# Patient Record
Sex: FEMALE | Race: WHITE | Employment: OTHER | URBAN - METROPOLITAN AREA
[De-identification: names, ages, dates, MRNs, and addresses within clinical notes are randomized per-mention and may not be internally consistent; named-entity substitution may affect disease eponyms.]

---

## 2017-01-03 ENCOUNTER — OFFICE VISIT (OUTPATIENT)
Dept: INTERNAL MEDICINE CLINIC | Age: 82
End: 2017-01-03

## 2017-01-03 VITALS
SYSTOLIC BLOOD PRESSURE: 160 MMHG | HEART RATE: 66 BPM | BODY MASS INDEX: 27.38 KG/M2 | TEMPERATURE: 98.1 F | WEIGHT: 145 LBS | DIASTOLIC BLOOD PRESSURE: 60 MMHG | OXYGEN SATURATION: 99 % | RESPIRATION RATE: 16 BRPM | HEIGHT: 61 IN

## 2017-01-03 DIAGNOSIS — Z79.4 TYPE 2 DIABETES MELLITUS WITH OTHER CIRCULATORY COMPLICATION, WITH LONG-TERM CURRENT USE OF INSULIN (HCC): Primary | ICD-10-CM

## 2017-01-03 DIAGNOSIS — J18.9 CAP (COMMUNITY ACQUIRED PNEUMONIA): ICD-10-CM

## 2017-01-03 DIAGNOSIS — I10 HTN (HYPERTENSION), BENIGN: ICD-10-CM

## 2017-01-03 DIAGNOSIS — E11.59 TYPE 2 DIABETES MELLITUS WITH OTHER CIRCULATORY COMPLICATION, WITH LONG-TERM CURRENT USE OF INSULIN (HCC): Primary | ICD-10-CM

## 2017-01-03 LAB — HBA1C MFR BLD HPLC: 8.1 %

## 2017-01-03 RX ORDER — SPIRONOLACTONE 25 MG/1
25 TABLET ORAL DAILY
Qty: 90 TAB | Refills: 1 | Status: SHIPPED | OUTPATIENT
Start: 2017-01-03 | End: 2017-11-01 | Stop reason: SDUPTHER

## 2017-01-03 NOTE — PROGRESS NOTES
Chief Complaint   Patient presents with    Diabetes     follow up   C/ Eras 47 11/1/2016; Pneumonia     1. Have you been to the ER, urgent care clinic since your last visit? Hospitalized since your last visit?see above    2. Have you seen or consulted any other health care providers outside of the [address] [place] [address] since your last visit? Include any pap smears or colon screening. No  \"Reviewed record in preparation for visit and have obtained necessary documentation. \"

## 2017-01-03 NOTE — MR AVS SNAPSHOT
Visit Information Date & Time Provider Department Dept. Phone Encounter #  
 1/3/2017 11:00 AM Deirdre Crespo, [facility] [phone] Upcoming Health Maintenance Date Due DTaP/Tdap/Td series (1 - Tdap) 12/3/1954 EYE EXAM RETINAL OR DILATED Q1 4/30/2014 GLAUCOMA SCREENING Q2Y 4/30/2015 FOOT EXAM Q1 7/28/2016 MEDICARE YEARLY EXAM 11/9/2016 HEMOGLOBIN A1C Q6M 4/5/2017 MICROALBUMIN Q1 4/7/2017 LIPID PANEL Q1 4/7/2017 Allergies as of 1/3/2017  Review Complete On: 1/3/2017 By: Jad Pelletier RN No Known Allergies Current Immunizations  Reviewed on 10/5/2016 Name Date Influenza High Dose Vaccine PF 11/9/2015, 11/3/2014 Influenza Vaccine 9/12/2013 Influenza Vaccine Split 9/25/2012, 9/27/2011, 12/20/2010 Pneumococcal Conjugate (PCV-13) 11/1/2016 Pneumococcal Vaccine (Unspecified Type) 11/3/2014, 1/2/2002 Zoster Vaccine, Live 9/15/2016 Not reviewed this visit You Were Diagnosed With   
  
 Codes Comments Type 2 diabetes mellitus with other circulatory complication, with long-term current use of insulin (HCC)    -  Primary ICD-10-CM: E11.59, Z79.4 CAP (community acquired pneumonia)     ICD-10-CM: J18.9 ICD-9-CM: 763 Vitals BP Pulse Temp Resp Height(growth percentile) Weight(growth percentile) 160/60 66 98.1 °F (36.7 °C) (Oral) 16 5' 1\" (1.549 m) 145 lb (65.8 kg) SpO2 BMI OB Status Smoking Status 99% 27.4 kg/m2 Hysterectomy Never Smoker Vitals History BMI and BSA Data Body Mass Index Body Surface Area  
 27.4 kg/m 2 1.68 m 2 Preferred Pharmacy Pharmacy Name Phone [address] [facility] [address] [phone] Your Updated Medication List  
  
   
This list is accurate as of: 1/3/17 11:51 AM.  Always use your most recent med list.  
  
  
  
  
 aspirin 81 mg tablet Take  by mouth. carvedilol 25 mg tablet Commonly known as:  COREG  
TAKE 1 TABLET TWICE DAILY WITH MEALS  
  
 felodipine 10 mg 24 hr tablet Commonly known as:  PLENDIL SR  
TAKE 1 TABLET EVERY DAY  
  
 gabapentin 100 mg capsule Commonly known as:  NEURONTIN  
TAKE 1 TO 2 CAPSULES AT BEDTIME  
  
 glipiZIDE SR 2.5 mg CR tablet Commonly known as:  GLUCOTROL XL  
TAKE 1 TABLET EVERY DAY (NEW DOSE) * Insulin Needles (Disposable) 31 gauge x 5/16\" Ndle Commonly known as:  BD INSULIN PEN NEEDLE UF SHORT Daily * PEN NEEDLE 31 gauge x 5/16\" Ndle Generic drug:  Insulin Needles (Disposable) USE DAILY * LEVEMIR FLEXTOUCH 100 unit/mL (3 mL) Inpn Generic drug:  insulin detemir INJECT 40 UNITS SUBCUTANEOUSLY DAILY AS DIRECTED * insulin detemir 100 unit/mL (3 mL) Inpn Commonly known as:  LEVEMIR FLEXTOUCH  
0.45 mL by SubCUTAneous route nightly. losartan-hydroCHLOROthiazide 100-25 mg per tablet Commonly known as:  HYZAAR  
TAKE 1 TABLET EVERY DAY  
  
 metFORMIN 1,000 mg tablet Commonly known as:  GLUCOPHAGE  
TAKE 1 TABLET TWICE DAILY ( WITH MEALS ) AS DIRECTED  
  
 sertraline 25 mg tablet Commonly known as:  ZOLOFT Take 1 Tab by mouth daily. simvastatin 40 mg tablet Commonly known as:  ZOCOR  
TAKE 1 TABLET EVERY NIGHT  
  
 spironolactone 25 mg tablet Commonly known as:  ALDACTONE Take 1 Tab by mouth daily. VITAMIN D3 1,000 unit tablet Generic drug:  cholecalciferol Take  by mouth daily. * Notice: This list has 4 medication(s) that are the same as other medications prescribed for you. Read the directions carefully, and ask your doctor or other care provider to review them with you. Prescriptions Sent to Pharmacy Refills  
 spironolactone (ALDACTONE) 25 mg tablet 1 Sig: Take 1 Tab by mouth daily. Class: Normal  
 Pharmacy: [address] [place] [address], [address] [place] [address] Ph #: [phone]  Route: Oral  
  
 Introducing [facility]! Oralia Luis introduces [employer] patient portal. Now you can access parts of your medical record, email your doctor's office, and request medication refills online. 1. In your internet browser, go to [email or web address] 2. Click on the First Time User? Click Here link in the Sign In box. You will see the New Member Sign Up page. 3. Enter your [employer] Access Code exactly as it appears below. You will not need to use this code after youve completed the sign-up process. If you do not sign up before the expiration date, you must request a new code. · [employer] Access Code: [email or web address] Expires: 1/3/2017  1:12 PM 
 
4. Enter the last four digits of your Social Security Number (xxxx) and Date of Birth (mm/dd/yyyy) as indicated and click Submit. You will be taken to the next sign-up page. 5. Create a [employer] ID. This will be your [employer] login ID and cannot be changed, so think of one that is secure and easy to remember. 6. Create a [employer] password. You can change your password at any time. 7. Enter your Password Reset Question and Answer. This can be used at a later time if you forget your password. 8. Enter your e-mail address. You will receive e-mail notification when new information is available in [address] 9. Click Sign Up. You can now view and download portions of your medical record. 10. Click the Download Summary menu link to download a portable copy of your medical information. If you have questions, please visit the Frequently Asked Questions section of the [employer] website. Remember, [employer] is NOT to be used for urgent needs. For medical emergencies, dial 911. Now available from your iPhone and Android! Please provide this summary of care documentation to your next provider. Your primary care clinician is listed as Sen Stephen. If you have any questions after today's visit, please call [phone].

## 2017-01-03 NOTE — PROGRESS NOTES
Chief Complaint   Patient presents with    Diabetes     follow up   C/ Eras 47 11/1/2016; Pneumonia     Subjective:   Guy Heath is a 80 y.o. female  here for follow up of chronic medical conditions listed   Patient Active Problem List    Diagnosis Date Noted    CAP (community acquired pneumonia) 01/03/2017    Type 2 diabetes mellitus with circulatory disorder, with long-term current use of insulin ([facility] 10/05/2016    HTN (hypertension), benign 10/05/2016    Coronary artery disease involving native coronary artery of native heart without angina pectoris 04/07/2016    Urinary incontinence without sensory awareness 04/07/2016    Type 2 diabetes mellitus with circulatory disorder ([facility] 12/12/2013    Mobility impaired 12/12/2013    Vitamin D deficiency 03/28/2012    Diabetes mellitus with renal manifestations, uncontrolled ([facility] 03/28/2012    Diabetes ([facility] 12/20/2010    CAD (coronary artery disease), native coronary artery 06/18/2010    Hypercholesterolemia 06/18/2010    REYMUNDO (obstructive sleep apnea) 06/18/2009     Current Outpatient Prescriptions   Medication Sig Dispense Refill    spironolactone (ALDACTONE) 25 mg tablet TAKE 1/2 TABLET EVERY DAY 45 Tab 2    felodipine (PLENDIL SR) 10 mg 24 hr tablet TAKE 1 TABLET EVERY DAY 90 Tab 1    simvastatin (ZOCOR) 40 mg tablet TAKE 1 TABLET EVERY NIGHT 90 Tab 1    metFORMIN (GLUCOPHAGE) 1,000 mg tablet TAKE 1 TABLET TWICE DAILY ( WITH MEALS ) AS DIRECTED 180 Tab 3    glipiZIDE SR (GLUCOTROL) 2.5 mg CR tablet TAKE 1 TABLET EVERY DAY (NEW DOSE) 90 Tab 1    sertraline (ZOLOFT) 25 mg tablet Take 1 Tab by mouth daily.  90 Tab 1    carvedilol (COREG) 25 mg tablet TAKE 1 TABLET TWICE DAILY WITH MEALS 180 Tab 3    losartan-hydrochlorothiazide (HYZAAR) 100-25 mg per tablet TAKE 1 TABLET EVERY DAY 90 Tab 2    gabapentin (NEURONTIN) 100 mg capsule TAKE 1 TO 2 CAPSULES AT BEDTIME 180 Cap 3    LEVEMIR FLEXTOUCH 100 unit/mL (3 mL) inpn INJECT 40 UNITS SUBCUTANEOUSLY DAILY AS DIRECTED 15 Pen 1    LEVEMIR FLEXTOUCH 100 unit/mL (3 mL) inpn INJECT 40 UNITS SUBCUTANEOUSLY DAILY AS DIRECTED 15 Pen 6    PEN NEEDLE 31 X 5/16 \" ndle USE DAILY 100 Package 11    Insulin Needles, Disposable, (BD INSULIN PEN NEEDLE UF SHORT) 31 X 5/16 \" Ndle Daily 1 Package 11    cholecalciferol (VITAMIN D3) 1,000 unit tablet Take  by mouth daily.  aspirin 81 mg tablet Take  by mouth. Past Medical History   Diagnosis Date    CAD (coronary artery disease)     CAD (coronary artery disease), native coronary artery 6/18/2010    Diabetes ([facility] 12/20/2010    Hypercholesterolemia 6/18/2010    Hypertension 6/18/2010    Mobility impaired 12/12/2013     Use cane and as needed wheelchair and walker      REYMUNDO (obstructive sleep apnea) 6/18/2010     Past Surgical History   Procedure Laterality Date    Hx gyn      Hx cholecystectomy      Hx appendectomy      Pr cardiac surg procedure unlist  1989     cabg    Pr cardiac surg procedure unlist       stent   . She  is accompanied by patient and daughter. She lives as follows: alone and does have Advanced Directives. New Complaints today include: Diabetes (follow up) and Hospital Follow Up [facility] 11/1/2016; Pneumonia)   This 80 y.o. female is more than 17 years post coronary bypass surgery with some element of congestive heart failure. She lives most of the time in [place] in a [facility]. Her daughter owns the [facility] park so she sees her daughter a lot. She complains of being lonely there. She has not a lot of friends and she is not able to do a lot. She notes, and her daughter notes, that mom has had shortness of breath with exertion which is worse over the last three to six months. Seen ER 11?2016 in [place] given levaquin  750 for cap    She has no PND or orthopnea. Denies angina. Generally has been not as well. She has had a depressed mood.   She has been somewhat sad, reserved, and withdrawn. She denies palpitation. Denies syncope or presyncope. She has not seen cardiology in a couple of years. She has been advised on several current occasions by me, to see her Cardiologist, Dr. Magaña Daily. She is very compliant with taking her medications. She is not compliant with her diet. She is not compliant with sleep CPAP. She has been told to have sleep apnea in the past and has never been compliant. She is not compliant with checking her blood sugars. She just does not check blood sugars. This is reviewed in detail with the family. Never saw cardiology      Recent History includes:She was seen in the emergency room in the hospital in [place], [place]. This was actually two months ago around the first or second of November. Was hospitalized overnight. Was admitted with community acquired pneumonia. I have copies of her lab reports. I do not have a copy of her chest X-ray. Her labs were pretty stable. There was no evidence of heart failure. Her chemistries were good. Her creatinine was 0.8. Her hemoglobin was 11.1. She did not have a leukocytosis or left shift. She was treated with antibiotics and sent home, and she improved. Her blood pressure was elevated. They told her to increase her Aldactone to 25 mg a day instead of 12.5 mg. She has not done that. She had called my office and I had told her to wait until I saw her. Home blood pressures have been a little elevated once again. Not doing home blood sugars. She feels better. She has regained her strength. She is back to normal activities. Review of Systems  A comprehensive review of systems was negative except for: Behvioral/Psych: positive for depression    Geriatric Issues:    Activities of Daily Living (ADLs):    She is independent in the following: ambulation, bathing and hygiene, feeding, continence, grooming, toileting and dressing  Requires assistance with the following: driving   food   No dementia    Outside reports reviewed: none. Patient Active Problem List    Diagnosis Date Noted    CAP (community acquired pneumonia) 01/03/2017    Type 2 diabetes mellitus with circulatory disorder, with long-term current use of insulin ([facility] 10/05/2016    HTN (hypertension), benign 10/05/2016    Coronary artery disease involving native coronary artery of native heart without angina pectoris 04/07/2016    Urinary incontinence without sensory awareness 04/07/2016    Type 2 diabetes mellitus with circulatory disorder ([facility] 12/12/2013    Mobility impaired 12/12/2013    Vitamin D deficiency 03/28/2012    Diabetes mellitus with renal manifestations, uncontrolled ([facility] 03/28/2012    Diabetes ([facility] 12/20/2010    CAD (coronary artery disease), native coronary artery 06/18/2010    Hypercholesterolemia 06/18/2010    REYMUNDO (obstructive sleep apnea) 06/18/2009     Current Outpatient Prescriptions   Medication Sig Dispense Refill    spironolactone (ALDACTONE) 25 mg tablet TAKE 1/2 TABLET EVERY DAY 45 Tab 2    felodipine (PLENDIL SR) 10 mg 24 hr tablet TAKE 1 TABLET EVERY DAY 90 Tab 1    simvastatin (ZOCOR) 40 mg tablet TAKE 1 TABLET EVERY NIGHT 90 Tab 1    metFORMIN (GLUCOPHAGE) 1,000 mg tablet TAKE 1 TABLET TWICE DAILY ( WITH MEALS ) AS DIRECTED 180 Tab 3    glipiZIDE SR (GLUCOTROL) 2.5 mg CR tablet TAKE 1 TABLET EVERY DAY (NEW DOSE) 90 Tab 1    sertraline (ZOLOFT) 25 mg tablet Take 1 Tab by mouth daily.  90 Tab 1    carvedilol (COREG) 25 mg tablet TAKE 1 TABLET TWICE DAILY WITH MEALS 180 Tab 3    losartan-hydrochlorothiazide (HYZAAR) 100-25 mg per tablet TAKE 1 TABLET EVERY DAY 90 Tab 2    gabapentin (NEURONTIN) 100 mg capsule TAKE 1 TO 2 CAPSULES AT BEDTIME 180 Cap 3    LEVEMIR FLEXTOUCH 100 unit/mL (3 mL) inpn INJECT 40 UNITS SUBCUTANEOUSLY DAILY AS DIRECTED 15 Pen 1    LEVEMIR FLEXTOUCH 100 unit/mL (3 mL) inpn INJECT 40 UNITS SUBCUTANEOUSLY DAILY AS DIRECTED 15 Pen 6    PEN NEEDLE 31 X 5/16 \" ndle USE DAILY 100 Package 11    Insulin Needles, Disposable, (BD INSULIN PEN NEEDLE UF SHORT) 31 X 5/16 \" Ndle Daily 1 Package 11    cholecalciferol (VITAMIN D3) 1,000 unit tablet Take  by mouth daily.  aspirin 81 mg tablet Take  by mouth.        Past Medical History   Diagnosis Date    CAD (coronary artery disease)     CAD (coronary artery disease), native coronary artery 6/18/2010    Diabetes ([address] 12/20/2010    Hypercholesterolemia 6/18/2010    Hypertension 6/18/2010    Mobility impaired 12/12/2013     Use cane and as needed wheelchair and walker      REYMUNDO (obstructive sleep apnea) 6/18/2010     Past Surgical History   Procedure Laterality Date    Hx gyn      Hx cholecystectomy      Hx appendectomy      Pr cardiac surg procedure unlist  1989     cabg    Pr cardiac surg procedure unlist       stent     Family History   Problem Relation Age of Onset    Heart Disease Mother     Heart Disease Father     Diabetes Brother         Vitals:    01/03/17 1108   BP: 169/69   Pulse: 66   Resp: 16   Temp: 98.1 °F (36.7 °C)   TempSrc: Oral   SpO2: 99%   Weight: 145 lb (65.8 kg)   Height: 5' 1\" (1.549 m)       Objective:     Visit Vitals    /69 (BP 1 Location: Right arm, BP Patient Position: Sitting)    Pulse 66    Temp 98.1 °F (36.7 °C) (Oral)    Resp 16    Ht 5' 1\" (1.549 m)    Wt 145 lb (65.8 kg)    SpO2 99%    BMI 27.4 kg/m2     Visit Vitals    /69 (BP 1 Location: Right arm, BP Patient Position: Sitting)    Pulse 66    Temp 98.1 °F (36.7 °C) (Oral)    Resp 16    Ht 5' 1\" (1.549 m)    Wt 145 lb (65.8 kg)    SpO2 99%    BMI 27.4 kg/m2     General appearance: alert, cooperative, no distress, appears stated age  Neck: supple, symmetrical, trachea midline, no adenopathy, thyroid: not enlarged, symmetric, no tenderness/mass/nodules, no carotid bruit and no JVD  Lungs: clear to auscultation bilaterally  Heart: normal apical impulse, regular rate and rhythm, S4 present  Abdomen: soft, non-tender. Bowel sounds normal. No masses,  no organomegaly  Neurologic: Grossly normal  Balance poor  Mood normal  Imaging      Lab Review  Lab Results   Component Value Date/Time    Hemoglobin A1c 7.3 03/22/2011 12:01 PM    Hemoglobin A1c 7.6 12/20/2010 12:16 PM    Hemoglobin A1c 8.8 09/20/2010 10:49 AM    Glucose 183 10/05/2016 02:23 PM    Microalbumin/Creat ratio (mg/g creat) 24 06/18/2010 10:04 AM    Microalb/Creat ratio (ug/mg creat.) 512.9 04/07/2016 10:38 AM    Microalbumin,urine random 2.66 06/18/2010 10:04 AM    LDL, calculated 69 04/07/2016 10:38 AM    Creatinine 0.79 10/05/2016 02:23 PM      Lab Results   Component Value Date/Time    Cholesterol, total 153 04/07/2016 10:38 AM    HDL Cholesterol 44 04/07/2016 10:38 AM    LDL, calculated 69 04/07/2016 10:38 AM    Triglyceride 200 04/07/2016 10:38 AM    CHOL/HDL Ratio 3.3 09/20/2010 10:33 AM       Lab Results   Component Value Date/Time    GFR est AA 81 10/05/2016 02:23 PM    GFR est non-AA 70 10/05/2016 02:23 PM    Creatinine 0.79 10/05/2016 02:23 PM    BUN 25 10/05/2016 02:23 PM    Sodium 145 10/05/2016 02:23 PM    Potassium 4.7 10/05/2016 02:23 PM    Chloride 102 10/05/2016 02:23 PM    CO2 24 10/05/2016 02:23 PM         Assessment/Plan:         High risk medications reviewed    Concern for worsening cad  But no symptoms    Diabetes controlled adequately    Reinforce good nutrition and not skippiong meals                Fall risk is high due to aging factors and multiple other medical issues    Fall prevention discussed and methods to reduce falls and injuries are reviewed in detail  Aguilar Joel was seen today for diabetes and hospital follow up.     Diagnoses and all orders for this visit:    Type 2 diabetes mellitus with other circulatory complication, with long-term current use of insulin (HCC)  -     AMB POC HEMOGLOBIN A1C    CAP (community acquired pneumonia)    HTN (hypertension), benign    Other orders  -     insulin detemir (LEVEMIR FLEXTOUCH) 100 unit/mL (3 mL) inpn; 0.45 mL by SubCUTAneous route nightly. -     spironolactone (ALDACTONE) 25 mg tablet; Take 1 Tab by mouth daily.       Increase insulin aldactone increase dose  CAP appears to be resolved from 2 months ago

## 2017-02-06 RX ORDER — SERTRALINE HYDROCHLORIDE 25 MG/1
TABLET, FILM COATED ORAL
Qty: 90 TAB | Refills: 1 | Status: SHIPPED | OUTPATIENT
Start: 2017-02-06 | End: 2017-06-21 | Stop reason: SDUPTHER

## 2017-02-07 ENCOUNTER — OFFICE VISIT (OUTPATIENT)
Dept: INTERNAL MEDICINE CLINIC | Age: 82
End: 2017-02-07

## 2017-02-07 VITALS
HEIGHT: 61 IN | BODY MASS INDEX: 27.38 KG/M2 | HEART RATE: 60 BPM | RESPIRATION RATE: 16 BRPM | TEMPERATURE: 97.7 F | SYSTOLIC BLOOD PRESSURE: 150 MMHG | DIASTOLIC BLOOD PRESSURE: 60 MMHG | OXYGEN SATURATION: 96 % | WEIGHT: 145 LBS

## 2017-02-07 DIAGNOSIS — J06.9 URI, ACUTE: Primary | ICD-10-CM

## 2017-02-07 NOTE — PROGRESS NOTES
Subjective:      History was provided by the patient. Vj Gar is a 80 y.o. female who presents for evaluation of symptoms of a URI. Symptoms include post nasal drip and chest congestion. Onset of symptoms was 3 days ago, unchanged since that time. She also c/o 2 days lightheadedness. She is drinking plenty of fluids. . Evaluation to date: none. Treatment to date: none  Past Medical History   Diagnosis Date    CAD (coronary artery disease)     CAD (coronary artery disease), native coronary artery 6/18/2010    Diabetes ([address] 12/20/2010    Hypercholesterolemia 6/18/2010    Hypertension 6/18/2010    Mobility impaired 12/12/2013     Use cane and as needed wheelchair and walker      REYMUNDO (obstructive sleep apnea) 6/18/2010     Current Outpatient Prescriptions   Medication Sig Dispense Refill    sertraline (ZOLOFT) 25 mg tablet TAKE 1 TABLET EVERY DAY 90 Tab 1    insulin detemir (LEVEMIR FLEXTOUCH) 100 unit/mL (3 mL) inpn 0.45 mL by SubCUTAneous route nightly. 15 Pen 1    spironolactone (ALDACTONE) 25 mg tablet Take 1 Tab by mouth daily.  90 Tab 1    felodipine (PLENDIL SR) 10 mg 24 hr tablet TAKE 1 TABLET EVERY DAY 90 Tab 1    simvastatin (ZOCOR) 40 mg tablet TAKE 1 TABLET EVERY NIGHT 90 Tab 1    metFORMIN (GLUCOPHAGE) 1,000 mg tablet TAKE 1 TABLET TWICE DAILY ( WITH MEALS ) AS DIRECTED 180 Tab 3    glipiZIDE SR (GLUCOTROL) 2.5 mg CR tablet TAKE 1 TABLET EVERY DAY (NEW DOSE) 90 Tab 1    carvedilol (COREG) 25 mg tablet TAKE 1 TABLET TWICE DAILY WITH MEALS 180 Tab 3    losartan-hydrochlorothiazide (HYZAAR) 100-25 mg per tablet TAKE 1 TABLET EVERY DAY 90 Tab 2    gabapentin (NEURONTIN) 100 mg capsule TAKE 1 TO 2 CAPSULES AT BEDTIME 180 Cap 3    LEVEMIR FLEXTOUCH 100 unit/mL (3 mL) inpn INJECT 40 UNITS SUBCUTANEOUSLY DAILY AS DIRECTED 15 Pen 6    PEN NEEDLE 31 X 5/16 \" ndle USE DAILY 100 Package 11    Insulin Needles, Disposable, (BD INSULIN PEN NEEDLE UF SHORT) 31 X 5/16 \" Ndle Daily 1 Package 11    cholecalciferol (VITAMIN D3) 1,000 unit tablet Take  by mouth daily.  aspirin 81 mg tablet Take  by mouth. No Known Allergies  Social History     Social History    Marital status: SINGLE     Spouse name: N/A    Number of children: N/A    Years of education: N/A     Occupational History    Not on file. Social History Main Topics    Smoking status: Never Smoker    Smokeless tobacco: Never Used    Alcohol use No    Drug use: No    Sexual activity: Not Currently     Other Topics Concern    Not on file     Social History Narrative     Review of Systems  A comprehensive review of systems was negative except for that written in the HPI. Objective:     Visit Vitals    /60    Pulse 60    Temp 97.7 °F (36.5 °C) (Oral)    Resp 16    Ht 5' 1\" (1.549 m)    Wt 145 lb (65.8 kg)    SpO2 96%    BMI 27.4 kg/m2     General:  alert, cooperative, no distress, appears stated age   Head:  NCAT w/o lesions or tenderness   Eyes: negative   Ears:    Sinus tender: negative   Mouth:  Lips, mucosa, and tongue normal. Teeth and gums normal   Neck: supple, symmetrical, trachea midline, no adenopathy, thyroid: not enlarged, symmetric, no tenderness/mass/nodules, no carotid bruit and no JVD. Lungs: clear to auscultation bilaterally   Abdomen: soft, non-tender. Bowel sounds normal. No masses,  no organomegaly        Assessment:     viral upper respiratory illness    Plan:   Discussed dx and tx of URIs  Discussed the importance of avoiding unnecessary abx therapy. Suggested symptomatic OTC remedies. RTC prn. .The patient complains of symptoms consistent with a viral upper respiratory infection. she has non-productive cough without dyspnea or wheezing. Symptomatic therapy suggested: mucinex 1200mg twice daily, pseudofedrine, tylenol or advil as directed on bottle. Increase fluids, use vaporizer, stay in steamy bathroom tid 15 min prn severe cough, rest, avoid smoky areas.     she was warned about pneumonia alarm symptoms and advised to call the office or come to the Emergency Room should they develop. Call office if seeing improvement in 72 hours.

## 2017-02-07 NOTE — MR AVS SNAPSHOT
Visit Information Date & Time Provider Department Dept. Phone Encounter #  
 2/7/2017 11:30 AM Cherelle Guthrie MD [facility] [phone] [id number] Your Appointments 4/6/2017 11:00 AM  
ROUTINE CARE with Cherelle Guthrie MD  
[facility] [address]) Appt Note: 3 month f/u  
 [place] [address] [place] [address] [facility] [address]  
[facility] [address] [facility] [address] [facility] [address] [address] Upcoming Health Maintenance Date Due DTaP/Tdap/Td series (1 - Tdap) [birth year]3/1954 EYE EXAM RETINAL OR DILATED Q1 4/30/2014 GLAUCOMA SCREENING Q2Y 4/30/2015 FOOT EXAM Q1 7/28/2016 MEDICARE YEARLY EXAM 11/9/2016 MICROALBUMIN Q1 4/7/2017 LIPID PANEL Q1 4/7/2017 HEMOGLOBIN A1C Q6M 7/3/2017 Allergies as of 2/7/2017  Review Complete On: 2/7/2017 By: Fabricio Muniz LPN No Known Allergies Current Immunizations  Reviewed on 10/5/2016 Name Date Influenza High Dose Vaccine PF 11/9/2015, 11/3/2014 Influenza Vaccine 9/12/2013 Influenza Vaccine Split 9/25/2012, 9/27/2011, 12/20/2010 Pneumococcal Conjugate (PCV-13) 11/1/2016 Pneumococcal Vaccine (Unspecified Type) 11/3/2014, 1/2/2002 Zoster Vaccine, Live 9/15/2016 Not reviewed this visit You Were Diagnosed With   
  
 Codes Comments URI, acute    -  Primary ICD-10-CM: J06.9 ICD-9-CM: 465.9 Vitals BP Pulse Temp Resp Height(growth percentile) Weight(growth percentile) 150/60 60 97.7 °F (36.5 °C) (Oral) 16 5' 1\" (1.549 m) 145 lb (65.8 kg) SpO2 BMI OB Status Smoking Status 96% 27.4 kg/m2 Hysterectomy Never Smoker Vitals History BMI and BSA Data Body Mass Index Body Surface Area  
 27.4 kg/m 2 1.68 m 2 Preferred Pharmacy Pharmacy Name Phone [facility] [address] Your Updated Medication List  
  
   
 This list is accurate as of: 2/7/17 12:10 PM.  Always use your most recent med list.  
  
  
  
  
 aspirin 81 mg tablet Take  by mouth. carvedilol 25 mg tablet Commonly known as:  COREG  
TAKE 1 TABLET TWICE DAILY WITH MEALS  
  
 felodipine 10 mg 24 hr tablet Commonly known as:  PLENDIL SR  
TAKE 1 TABLET EVERY DAY  
  
 gabapentin 100 mg capsule Commonly known as:  NEURONTIN  
TAKE 1 TO 2 CAPSULES AT BEDTIME  
  
 glipiZIDE SR 2.5 mg CR tablet Commonly known as:  GLUCOTROL XL  
TAKE 1 TABLET EVERY DAY (NEW DOSE) * Insulin Needles (Disposable) 31 gauge x 5/16\" Ndle Commonly known as:  BD INSULIN PEN NEEDLE UF SHORT Daily * PEN NEEDLE 31 gauge x 5/16\" Ndle Generic drug:  Insulin Needles (Disposable) USE DAILY * LEVEMIR FLEXTOUCH 100 unit/mL (3 mL) Inpn Generic drug:  insulin detemir INJECT 40 UNITS SUBCUTANEOUSLY DAILY AS DIRECTED * insulin detemir 100 unit/mL (3 mL) Inpn Commonly known as:  LEVEMIR FLEXTOUCH  
0.45 mL by SubCUTAneous route nightly. losartan-hydroCHLOROthiazide 100-25 mg per tablet Commonly known as:  HYZAAR  
TAKE 1 TABLET EVERY DAY  
  
 metFORMIN 1,000 mg tablet Commonly known as:  GLUCOPHAGE  
TAKE 1 TABLET TWICE DAILY ( WITH MEALS ) AS DIRECTED  
  
 sertraline 25 mg tablet Commonly known as:  ZOLOFT  
TAKE 1 TABLET EVERY DAY  
  
 simvastatin 40 mg tablet Commonly known as:  ZOCOR  
TAKE 1 TABLET EVERY NIGHT  
  
 spironolactone 25 mg tablet Commonly known as:  ALDACTONE Take 1 Tab by mouth daily. VITAMIN D3 1,000 unit tablet Generic drug:  cholecalciferol Take  by mouth daily. * Notice: This list has 4 medication(s) that are the same as other medications prescribed for you. Read the directions carefully, and ask your doctor or other care provider to review them with you. Introducing hospitals & HEALTH SERVICES!    
 Atilio Martínez introduces [employer] patient portal. Now you can access parts of your medical record, email your doctor's office, and request medication refills online. 1. In your internet browser, go to [email or web address] 2. Click on the First Time User? Click Here link in the Sign In box. You will see the New Member Sign Up page. 3. Enter your [employer] Access Code exactly as it appears below. You will not need to use this code after youve completed the sign-up process. If you do not sign up before the expiration date, you must request a new code. · [employer] Access Code: [email or web address] Expires: 5/8/2017 12:10 PM 
 
4. Enter the last four digits of your Social Security Number (xxxx) and Date of Birth (mm/dd/yyyy) as indicated and click Submit. You will be taken to the next sign-up page. 5. Create a [employer] ID. This will be your [employer] login ID and cannot be changed, so think of one that is secure and easy to remember. 6. Create a [employer] password. You can change your password at any time. 7. Enter your Password Reset Question and Answer. This can be used at a later time if you forget your password. 8. Enter your e-mail address. You will receive e-mail notification when new information is available in [address] 9. Click Sign Up. You can now view and download portions of your medical record. 10. Click the Download Summary menu link to download a portable copy of your medical information. If you have questions, please visit the Frequently Asked Questions section of the [employer] website. Remember, [employer] is NOT to be used for urgent needs. For medical emergencies, dial 911. Now available from your iPhone and Android! Please provide this summary of care documentation to your next provider. Your primary care clinician is listed as Saintclair Rima. If you have any questions after today's visit, please call [phone].

## 2017-02-27 RX ORDER — GLIPIZIDE 2.5 MG/1
TABLET, EXTENDED RELEASE ORAL
Qty: 90 TAB | Refills: 1 | Status: SHIPPED | OUTPATIENT
Start: 2017-02-27 | End: 2017-07-17 | Stop reason: SDUPTHER

## 2017-03-13 RX ORDER — FELODIPINE 10 MG/1
TABLET, EXTENDED RELEASE ORAL
Qty: 90 TAB | Refills: 1 | Status: SHIPPED | OUTPATIENT
Start: 2017-03-13 | End: 2017-07-31 | Stop reason: SDUPTHER

## 2017-05-05 ENCOUNTER — HOSPITAL ENCOUNTER (OUTPATIENT)
Dept: LAB | Age: 82
Discharge: HOME | End: 2017-05-05
Payer: MEDICARE

## 2017-05-05 ENCOUNTER — OFFICE VISIT (OUTPATIENT)
Dept: INTERNAL MEDICINE CLINIC | Age: 82
End: 2017-05-05

## 2017-05-05 VITALS
OXYGEN SATURATION: 98 % | WEIGHT: 148 LBS | HEIGHT: 61 IN | DIASTOLIC BLOOD PRESSURE: 74 MMHG | RESPIRATION RATE: 16 BRPM | BODY MASS INDEX: 27.94 KG/M2 | HEART RATE: 70 BPM | SYSTOLIC BLOOD PRESSURE: 170 MMHG

## 2017-05-05 DIAGNOSIS — Z13.39 SCREENING FOR ALCOHOLISM: ICD-10-CM

## 2017-05-05 DIAGNOSIS — Z79.4 UNCONTROLLED TYPE 2 DIABETES MELLITUS WITH CHRONIC KIDNEY DISEASE, WITH LONG-TERM CURRENT USE OF INSULIN, UNSPECIFIED CKD STAGE: Primary | ICD-10-CM

## 2017-05-05 DIAGNOSIS — Z71.89 COUNSELING REGARDING ADVANCED DIRECTIVES: ICD-10-CM

## 2017-05-05 DIAGNOSIS — E11.22 UNCONTROLLED TYPE 2 DIABETES MELLITUS WITH CHRONIC KIDNEY DISEASE, WITH LONG-TERM CURRENT USE OF INSULIN, UNSPECIFIED CKD STAGE: Primary | ICD-10-CM

## 2017-05-05 DIAGNOSIS — E78.00 HYPERCHOLESTEROLEMIA: ICD-10-CM

## 2017-05-05 DIAGNOSIS — Z00.00 ROUTINE GENERAL MEDICAL EXAMINATION AT A HEALTH CARE FACILITY: ICD-10-CM

## 2017-05-05 DIAGNOSIS — E11.65 UNCONTROLLED TYPE 2 DIABETES MELLITUS WITH CHRONIC KIDNEY DISEASE, WITH LONG-TERM CURRENT USE OF INSULIN, UNSPECIFIED CKD STAGE: Primary | ICD-10-CM

## 2017-05-05 DIAGNOSIS — E11.59 TYPE 2 DIABETES MELLITUS WITH OTHER CIRCULATORY COMPLICATION, WITH LONG-TERM CURRENT USE OF INSULIN (HCC): ICD-10-CM

## 2017-05-05 DIAGNOSIS — Z79.4 TYPE 2 DIABETES MELLITUS WITH OTHER CIRCULATORY COMPLICATION, WITH LONG-TERM CURRENT USE OF INSULIN (HCC): ICD-10-CM

## 2017-05-05 DIAGNOSIS — I25.10 CORONARY ARTERY DISEASE INVOLVING NATIVE CORONARY ARTERY OF NATIVE HEART WITHOUT ANGINA PECTORIS: ICD-10-CM

## 2017-05-05 PROBLEM — J18.9 CAP (COMMUNITY ACQUIRED PNEUMONIA): Status: RESOLVED | Noted: 2017-01-03 | Resolved: 2017-05-05

## 2017-05-05 PROCEDURE — 82043 UR ALBUMIN QUANTITATIVE: CPT

## 2017-05-05 PROCEDURE — 80061 LIPID PANEL: CPT

## 2017-05-05 PROCEDURE — 85025 COMPLETE CBC W/AUTO DIFF WBC: CPT

## 2017-05-05 PROCEDURE — 80053 COMPREHEN METABOLIC PANEL: CPT

## 2017-05-05 PROCEDURE — 36415 COLL VENOUS BLD VENIPUNCTURE: CPT

## 2017-05-05 NOTE — MR AVS SNAPSHOT
Visit Information Date & Time Provider Department Dept. Phone Encounter #  
 5/5/2017 11:15 AM Adriane Caldera MD [facility] [phone] [id number] Upcoming Health Maintenance Date Due DTaP/Tdap/Td series (1 - Tdap) 12/3/1954 EYE EXAM RETINAL OR DILATED Q1 4/30/2014 GLAUCOMA SCREENING Q2Y 4/30/2015 FOOT EXAM Q1 7/28/2016 MEDICARE YEARLY EXAM 11/9/2016 MICROALBUMIN Q1 4/7/2017 LIPID PANEL Q1 4/7/2017 HEMOGLOBIN A1C Q6M 7/3/2017 INFLUENZA AGE 9 TO ADULT 8/1/2017 Allergies as of 5/5/2017  Review Complete On: 5/5/2017 By: Emmanuel Canseco LPN No Known Allergies Current Immunizations  Reviewed on 5/5/2017 Name Date Influenza High Dose Vaccine PF 11/9/2015, 11/3/2014 Influenza Vaccine 9/12/2013 Influenza Vaccine Split 9/25/2012, 9/27/2011, 12/20/2010 Pneumococcal Conjugate (PCV-13) 11/1/2016 Pneumococcal Vaccine (Unspecified Type) 11/3/2014, 1/2/2002 Zoster Vaccine, Live 9/15/2016 Reviewed by Adriane Caldera MD on 5/5/2017 at 11:20 AM  
You Were Diagnosed With   
  
 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  
 170/74 70 16 5' 1\" (1.549 m) 148 lb (67.1 kg) 98% BMI OB Status Smoking Status 27.96 kg/m2 Hysterectomy Never Smoker Vitals History BMI and BSA Data Body Mass Index Body Surface Area  
 27.96 kg/m 2 1.7 m 2 Preferred Pharmacy Pharmacy Name [facility] [address] [facility] Your Updated Medication List  
  
   
This list is accurate as of: 5/5/17 11:36 AM.  Always use your most recent med list.  
  
  
  
  
 aspirin 81 mg tablet Take  by mouth. carvedilol 25 mg tablet Commonly known as:  COREG  
TAKE 1 TABLET TWICE DAILY WITH MEALS  
  
 felodipine 10 mg 24 hr tablet Commonly known as:  PLENDIL SR  
TAKE 1 TABLET EVERY DAY  
  
 gabapentin 100 mg capsule Commonly known as:  NEURONTIN  
TAKE 1 TO 2 CAPSULES AT BEDTIME  
  
 glipiZIDE SR 2.5 mg CR tablet Commonly known as:  GLUCOTROL XL  
TAKE 1 TABLET EVERY DAY (NEW DOSE) * insulin detemir 100 unit/mL (3 mL) Inpn Commonly known as:  LEVEMIR FLEXTOUCH  
0.45 mL by SubCUTAneous route nightly. * LEVEMIR FLEXTOUCH 100 unit/mL (3 mL) Inpn Generic drug:  insulin detemir INJECT 40 UNITS SUBCUTANEOUSLY EVERY DAY AS DIRECTED * Insulin Needles (Disposable) 31 gauge x 5/16\" Ndle Commonly known as:  BD INSULIN PEN NEEDLE UF SHORT Daily * PEN NEEDLE 31 gauge x 5/16\" Ndle Generic drug:  Insulin Needles (Disposable) USE DAILY losartan-hydroCHLOROthiazide 100-25 mg per tablet Commonly known as:  HYZAAR  
TAKE 1 TABLET EVERY DAY  
  
 metFORMIN 1,000 mg tablet Commonly known as:  GLUCOPHAGE  
TAKE 1 TABLET TWICE DAILY ( WITH MEALS ) AS DIRECTED  
  
 sertraline 25 mg tablet Commonly known as:  ZOLOFT  
TAKE 1 TABLET EVERY DAY  
  
 simvastatin 40 mg tablet Commonly known as:  ZOCOR  
TAKE 1 TABLET EVERY NIGHT  
  
 spironolactone 25 mg tablet Commonly known as:  ALDACTONE Take 1 Tab by mouth daily. VITAMIN D3 1,000 unit tablet Generic drug:  cholecalciferol Take  by mouth daily. * Notice:   This list has 4 medication(s) that are the same as other medications prescribed for you. Read the directions carefully, and ask your doctor or other care provider to review them with you. We Performed the Following CBC WITH AUTOMATED DIFF [92513 CPT(R)]  DIABETES FOOT EXAM [HM7 Custom] LIPID PANEL [86917 CPT(R)] METABOLIC PANEL, COMPREHENSIVE [16770 CPT(R)] MICROALBUMIN, UR, RAND W/ MICROALBUMIN/CREA RATIO Y317261 CPT(R)] Introducing [facility] & HEALTH SERVICES! Sheron Altamirano introduces [employer] patient portal. Now you can access parts of your medical record, email your doctor's office, and request medication refills online. 1. In your internet browser, go to [email or web address] 2. Click on the First Time User? Click Here link in the Sign In box. You will see the New Member Sign Up page. 3. Enter your [employer] Access Code exactly as it appears below. You will not need to use this code after youve completed the sign-up process. If you do not sign up before the expiration date, you must request a new code. · [employer] Access Code: [email or web address] Expires: 5/8/2017  1:10 PM 
 
4. Enter the last four digits of your Social Security Number (xxxx) and Date of Birth (mm/dd/yyyy) as indicated and click Submit. You will be taken to the next sign-up page. 5. Create a [employer] ID. This will be your [employer] login ID and cannot be changed, so think of one that is secure and easy to remember. 6. Create a [employer] password. You can change your password at any time. 7. Enter your Password Reset Question and Answer. This can be used at a later time if you forget your password. 8. Enter your e-mail address. You will receive e-mail notification when new information is available in [address] 9. Click Sign Up. You can now view and download portions of your medical record. 10. Click the Download Summary menu link to download a portable copy of your medical information. If you have questions, please visit the Frequently Asked Questions section of the [employer] website. Remember, [employer] is NOT to be used for urgent needs. For medical emergencies, dial 911. Now available from your iPhone and Android! Please provide this summary of care documentation to your next provider. Your primary care clinician is listed as Violeta Garcia. If you have any questions after today's visit, please call [phone].

## 2017-05-05 NOTE — PROGRESS NOTES
Chief Complaint   Patient presents with    Follow-up     Subjective:   Marquis Sweet is a 80 y.o. female  here for follow up of chronic medical conditions listed   Patient Active Problem List    Diagnosis Date Noted    CAP (community acquired pneumonia) 01/03/2017    Type 2 diabetes mellitus with circulatory disorder, with long-term current use of insulin ([facility] 10/05/2016    HTN (hypertension), benign 10/05/2016    Coronary artery disease involving native coronary artery of native heart without angina pectoris 04/07/2016    Urinary incontinence without sensory awareness 04/07/2016    Type 2 diabetes mellitus with circulatory disorder ([facility] 12/12/2013    Mobility impaired 12/12/2013    Vitamin D deficiency 03/28/2012    Diabetes mellitus with renal manifestations, uncontrolled ([facility] 03/28/2012    Diabetes ([facility] 12/20/2010    CAD (coronary artery disease), native coronary artery 06/18/2010    Hypercholesterolemia 06/18/2010    REYMUNDO (obstructive sleep apnea) 06/18/2009     Current Outpatient Prescriptions   Medication Sig Dispense Refill    felodipine (PLENDIL SR) 10 mg 24 hr tablet TAKE 1 TABLET EVERY DAY 90 Tab 1    LEVEMIR FLEXTOUCH 100 unit/mL (3 mL) inpn INJECT 40 UNITS SUBCUTANEOUSLY EVERY DAY AS DIRECTED 15 Pen 3    glipiZIDE SR (GLUCOTROL XL) 2.5 mg CR tablet TAKE 1 TABLET EVERY DAY (NEW DOSE) 90 Tab 1    sertraline (ZOLOFT) 25 mg tablet TAKE 1 TABLET EVERY DAY 90 Tab 1    insulin detemir (LEVEMIR FLEXTOUCH) 100 unit/mL (3 mL) inpn 0.45 mL by SubCUTAneous route nightly. 15 Pen 1    spironolactone (ALDACTONE) 25 mg tablet Take 1 Tab by mouth daily.  90 Tab 1    simvastatin (ZOCOR) 40 mg tablet TAKE 1 TABLET EVERY NIGHT 90 Tab 1    metFORMIN (GLUCOPHAGE) 1,000 mg tablet TAKE 1 TABLET TWICE DAILY ( WITH MEALS ) AS DIRECTED 180 Tab 3    carvedilol (COREG) 25 mg tablet TAKE 1 TABLET TWICE DAILY WITH MEALS 180 Tab 3    losartan-hydrochlorothiazide (HYZAAR) 100-25 mg per tablet TAKE 1 TABLET EVERY DAY 90 Tab 2    gabapentin (NEURONTIN) 100 mg capsule TAKE 1 TO 2 CAPSULES AT BEDTIME 180 Cap 3    PEN NEEDLE 31 X 5/16 \" ndle USE DAILY 100 Package 11    Insulin Needles, Disposable, (BD INSULIN PEN NEEDLE UF SHORT) 31 X 5/16 \" Ndle Daily 1 Package 11    cholecalciferol (VITAMIN D3) 1,000 unit tablet Take  by mouth daily.  aspirin 81 mg tablet Take  by mouth. Past Medical History:   Diagnosis Date    CAD (coronary artery disease)     CAD (coronary artery disease), native coronary artery 6/18/2010    Diabetes ([facility] 12/20/2010    Hypercholesterolemia 6/18/2010    Hypertension 6/18/2010    Mobility impaired 12/12/2013    Use cane and as needed wheelchair and walker      REYMUNDO (obstructive sleep apnea) 6/18/2010     Past Surgical History:   Procedure Laterality Date    CARDIAC SURG PROCEDURE UNLIST  1989    cabg    CARDIAC SURG PROCEDURE UNLIST      stent    HX APPENDECTOMY      HX CHOLECYSTECTOMY      HX GYN     . She  is accompanied by patient  She lives as follows: alone and does have Advanced Directives. New Complaints today include: Follow-up   This 80 y.o. female is more than 17 years post coronary bypass surgery with some element of congestive heart failure. She lives most of the time in [facility] in a trailer. Her daughter owns the [facility] park so she sees her daughter a lot. She complains of being lonely there. She has not a lot of friends and she is not able to do a lot. She notes, and her daughter notes, that mom has had shortness of breath with exertion which is worse over the last three to six months. Seen ER 11?2016 in [place] given levaquin  750 for cap    She has no PND or orthopnea. Denies angina. Generally has been not as well. She has had a depressed mood. She has been somewhat sad, reserved, and withdrawn. She denies palpitation. Denies syncope or presyncope. She has not seen cardiology in a couple of years.   She has been advised on several current occasions by me, to see her Cardiologist, Dr. Osvaldo Rome. She is very compliant with taking her medications. She is not compliant with her diet. She is not compliant with sleep CPAP. She has been told to have sleep apnea in the past and has never been compliant. She is not compliant with checking her blood sugars. She just does not check blood sugars. This is reviewed in detail with the family. Never saw cardiology      Recent History includes:She was seen in the emergency room in the hospital in [place], [place]. This was actually two months ago around the first or second of November. Was hospitalized overnight. Was admitted with community acquired pneumonia. I have copies of her lab reports. I do not have a copy of her chest X-ray. Her labs were pretty stable. There was no evidence of heart failure. Her chemistries were good. Her creatinine was 0.8. Her hemoglobin was 11.1. She did not have a leukocytosis or left shift. She was treated with antibiotics and sent home, and she improved. Her blood pressure was elevated. They told her to increase her Aldactone to 25 mg a day instead of 12.5 mg. She has not done that. She had called my office and I had told her to wait until I saw her. Home blood pressures have been a little elevated once again. Not doing home blood sugars. She feels good  She has regained her strength. She is back to normal activities. Review of Systems  A comprehensive review of systems was negative except for: Behvioral/Psych: positive for depression    Geriatric Issues: Activities of Daily Living (ADLs):    She is independent in the following: ambulation, bathing and hygiene, feeding, continence, grooming, toileting and dressing  Requires assistance with the following: driving   food   No dementia    Outside reports reviewed: none.     Patient Active Problem List    Diagnosis Date Noted    CAP (community acquired pneumonia) 01/03/2017    Type 2 diabetes mellitus with circulatory disorder, with long-term current use of insulin ([facility] 10/05/2016    HTN (hypertension), benign 10/05/2016    Coronary artery disease involving native coronary artery of native heart without angina pectoris 04/07/2016    Urinary incontinence without sensory awareness 04/07/2016    Type 2 diabetes mellitus with circulatory disorder ([facility] 12/12/2013    Mobility impaired 12/12/2013    Vitamin D deficiency 03/28/2012    Diabetes mellitus with renal manifestations, uncontrolled ([facility] 03/28/2012    Diabetes ([facility] 12/20/2010    CAD (coronary artery disease), native coronary artery 06/18/2010    Hypercholesterolemia 06/18/2010    REYMUNDO (obstructive sleep apnea) 06/18/2009     Current Outpatient Prescriptions   Medication Sig Dispense Refill    felodipine (PLENDIL SR) 10 mg 24 hr tablet TAKE 1 TABLET EVERY DAY 90 Tab 1    LEVEMIR FLEXTOUCH 100 unit/mL (3 mL) inpn INJECT 40 UNITS SUBCUTANEOUSLY EVERY DAY AS DIRECTED 15 Pen 3    glipiZIDE SR (GLUCOTROL XL) 2.5 mg CR tablet TAKE 1 TABLET EVERY DAY (NEW DOSE) 90 Tab 1    sertraline (ZOLOFT) 25 mg tablet TAKE 1 TABLET EVERY DAY 90 Tab 1    insulin detemir (LEVEMIR FLEXTOUCH) 100 unit/mL (3 mL) inpn 0.45 mL by SubCUTAneous route nightly. 15 Pen 1    spironolactone (ALDACTONE) 25 mg tablet Take 1 Tab by mouth daily.  90 Tab 1    simvastatin (ZOCOR) 40 mg tablet TAKE 1 TABLET EVERY NIGHT 90 Tab 1    metFORMIN (GLUCOPHAGE) 1,000 mg tablet TAKE 1 TABLET TWICE DAILY ( WITH MEALS ) AS DIRECTED 180 Tab 3    carvedilol (COREG) 25 mg tablet TAKE 1 TABLET TWICE DAILY WITH MEALS 180 Tab 3    losartan-hydrochlorothiazide (HYZAAR) 100-25 mg per tablet TAKE 1 TABLET EVERY DAY 90 Tab 2    gabapentin (NEURONTIN) 100 mg capsule TAKE 1 TO 2 CAPSULES AT BEDTIME 180 Cap 3    PEN NEEDLE 31 X 5/16 \" ndle USE DAILY 100 Package 11    Insulin Needles, Disposable, (BD INSULIN PEN NEEDLE UF SHORT) 31 X 5/16 \" Ndle Daily 1 Package 11    cholecalciferol (VITAMIN D3) 1,000 unit tablet Take  by mouth daily.  aspirin 81 mg tablet Take  by mouth. Past Medical History:   Diagnosis Date    CAD (coronary artery disease)     CAD (coronary artery disease), native coronary artery 6/18/2010    Diabetes ([address] 12/20/2010    Hypercholesterolemia 6/18/2010    Hypertension 6/18/2010    Mobility impaired 12/12/2013    Use cane and as needed wheelchair and walker      REYMUNDO (obstructive sleep apnea) 6/18/2010     Past Surgical History:   Procedure Laterality Date    CARDIAC SURG PROCEDURE UNLIST  1989    cabg    CARDIAC SURG PROCEDURE UNLIST      stent    HX APPENDECTOMY      HX CHOLECYSTECTOMY      HX GYN       Family History   Problem Relation Age of Onset    Heart Disease Mother     Heart Disease Father     Diabetes Brother         Vitals:    05/05/17 1109   BP: 170/74   Pulse: 70   Resp: 16   SpO2: 98%   Weight: 148 lb (67.1 kg)   Height: 5' 1\" (1.549 m)       Objective:     Visit Vitals    /74    Pulse 70    Resp 16    Ht 5' 1\" (1.549 m)    Wt 148 lb (67.1 kg)    SpO2 98%    BMI 27.96 kg/m2     Visit Vitals    /74    Pulse 70    Resp 16    Ht 5' 1\" (1.549 m)    Wt 148 lb (67.1 kg)    SpO2 98%    BMI 27.96 kg/m2     General appearance: alert, cooperative, no distress, appears stated age  Neck: supple, symmetrical, trachea midline, no adenopathy, thyroid: not enlarged, symmetric, no tenderness/mass/nodules, no carotid bruit and no JVD  Lungs: clear to auscultation bilaterally  Heart: normal apical impulse, regular rate and rhythm, S4 present  Abdomen: soft, non-tender.  Bowel sounds normal. No masses,  no organomegaly  Neurologic: Grossly normal  Balance poor  Mood normal  Imaging      Lab Review  Lab Results   Component Value Date/Time    Hemoglobin A1c 7.3 03/22/2011 12:01 PM    Hemoglobin A1c 7.6 12/20/2010 12:16 PM    Hemoglobin A1c 8.8 09/20/2010 10:49 AM    Glucose 183 10/05/2016 02:23 PM Microalbumin/Creat ratio (mg/g creat) 24 06/18/2010 10:04 AM    Microalb/Creat ratio (ug/mg creat.) 512.9 04/07/2016 10:38 AM    Microalbumin,urine random 2.66 06/18/2010 10:04 AM    LDL, calculated 69 04/07/2016 10:38 AM    Creatinine 0.79 10/05/2016 02:23 PM      Lab Results   Component Value Date/Time    Cholesterol, total 153 04/07/2016 10:38 AM    HDL Cholesterol 44 04/07/2016 10:38 AM    LDL, calculated 69 04/07/2016 10:38 AM    Triglyceride 200 04/07/2016 10:38 AM    CHOL/HDL Ratio 3.3 09/20/2010 10:33 AM       Lab Results   Component Value Date/Time    GFR est AA 81 10/05/2016 02:23 PM    GFR est non-AA 70 10/05/2016 02:23 PM    Creatinine 0.79 10/05/2016 02:23 PM    BUN 25 10/05/2016 02:23 PM    Sodium 145 10/05/2016 02:23 PM    Potassium 4.7 10/05/2016 02:23 PM    Chloride 102 10/05/2016 02:23 PM    CO2 24 10/05/2016 02:23 PM         Assessment/Plan:         High risk medications reviewed    Concern for worsening cad  But no symptoms    Diabetes controlled adequately    Reinforce good nutrition and not skippiong meals                Fall risk is high due to aging factors and multiple other medical issues    Fall prevention discussed and methods to reduce falls and injuries are reviewed in detail  There are no diagnoses linked to this encounter. Increase insulin aldactone increase dose  CAP appears to be resolved from 2 months ag    This is a Subsequent Medicare Annual Wellness Visit providing Personalized Prevention Plan Services (PPPS) (Performed 12 months after initial AWV and PPPS )    I have reviewed the patient's medical history in detail and updated the computerized patient record.      History     Past Medical History:   Diagnosis Date    CAD (coronary artery disease)     CAD (coronary artery disease), native coronary artery 6/18/2010    Diabetes ([facility] 12/20/2010    Hypercholesterolemia 6/18/2010    Hypertension 6/18/2010    Mobility impaired 12/12/2013    Use cane and as needed wheelchair and walker      REYMUNDO (obstructive sleep apnea) 6/18/2010      Past Surgical History:   Procedure Laterality Date    CARDIAC SURG PROCEDURE UNLIST  1989    cabg    CARDIAC SURG PROCEDURE UNLIST      stent    HX APPENDECTOMY      HX CHOLECYSTECTOMY      HX GYN       Current Outpatient Prescriptions   Medication Sig Dispense Refill    felodipine (PLENDIL SR) 10 mg 24 hr tablet TAKE 1 TABLET EVERY DAY 90 Tab 1    LEVEMIR FLEXTOUCH 100 unit/mL (3 mL) inpn INJECT 40 UNITS SUBCUTANEOUSLY EVERY DAY AS DIRECTED 15 Pen 3    glipiZIDE SR (GLUCOTROL XL) 2.5 mg CR tablet TAKE 1 TABLET EVERY DAY (NEW DOSE) 90 Tab 1    sertraline (ZOLOFT) 25 mg tablet TAKE 1 TABLET EVERY DAY 90 Tab 1    insulin detemir (LEVEMIR FLEXTOUCH) 100 unit/mL (3 mL) inpn 0.45 mL by SubCUTAneous route nightly. 15 Pen 1    spironolactone (ALDACTONE) 25 mg tablet Take 1 Tab by mouth daily. 90 Tab 1    simvastatin (ZOCOR) 40 mg tablet TAKE 1 TABLET EVERY NIGHT 90 Tab 1    metFORMIN (GLUCOPHAGE) 1,000 mg tablet TAKE 1 TABLET TWICE DAILY ( WITH MEALS ) AS DIRECTED 180 Tab 3    carvedilol (COREG) 25 mg tablet TAKE 1 TABLET TWICE DAILY WITH MEALS 180 Tab 3    losartan-hydrochlorothiazide (HYZAAR) 100-25 mg per tablet TAKE 1 TABLET EVERY DAY 90 Tab 2    gabapentin (NEURONTIN) 100 mg capsule TAKE 1 TO 2 CAPSULES AT BEDTIME 180 Cap 3    PEN NEEDLE 31 X 5/16 \" ndle USE DAILY 100 Package 11    Insulin Needles, Disposable, (BD INSULIN PEN NEEDLE UF SHORT) 31 X 5/16 \" Ndle Daily 1 Package 11    cholecalciferol (VITAMIN D3) 1,000 unit tablet Take  by mouth daily.  aspirin 81 mg tablet Take  by mouth.        No Known Allergies  Family History   Problem Relation Age of Onset    Heart Disease Mother     Heart Disease Father     Diabetes Brother      Social History   Substance Use Topics    Smoking status: Never Smoker    Smokeless tobacco: Never Used    Alcohol use No     Patient Active Problem List   Diagnosis Code    CAD (coronary artery disease), native coronary artery I25.10    REYMUNDO (obstructive sleep apnea) G47.33    Hypercholesterolemia E78.00    Diabetes ([facility] E11.9    Vitamin D deficiency E55.9    Diabetes mellitus with renal manifestations, uncontrolled ([facility]) E11.29, E11.65    Type 2 diabetes mellitus with circulatory disorder ([facility]) E11.59    Mobility impaired Z74.09    Coronary artery disease involving native coronary artery of native heart without angina pectoris I25.10    Urinary incontinence without sensory awareness N39.42    Type 2 diabetes mellitus with circulatory disorder, with long-term current use of insulin ([facility]) E11.59, Z79.4    HTN (hypertension), benign I10    CAP (community acquired pneumonia) J18.9       Depression Risk Factor Screening:     PHQ over the last two weeks 5/5/2017   Little interest or pleasure in doing things Not at all   Feeling down, depressed or hopeless Not at all   Total Score PHQ 2 0     Alcohol Risk Factor Screening: On any occasion during the past 3 months, have you had more than 3 drinks containing alcohol? No    Do you average more than 7 drinks per week? No      Functional Ability and Level of Safety:     Hearing Loss   moderate    Activities of Daily Living   Self-care. Requires assistance with: no ADLs    Fall Risk     Fall Risk Assessment, last 12 mths 5/5/2017   Able to walk? Yes   Fall in past 12 months? No     Abuse Screen   Patient is not abused    Review of Systems   Pertinent items are noted in HPI.     Physical Examination     Evaluation of Cognitive Function:  Mood/affect:  neutral, happy  Appearance: age appropriate  Family member/caregiver input: no    Visit Vitals    /74    Pulse 70    Resp 16    Ht 5' 1\" (1.549 m)    Wt 148 lb (67.1 kg)    SpO2 98%    BMI 27.96 kg/m2     General appearance: alert, cooperative, no distress, appears stated age  Lungs: clear to auscultation bilaterally  Heart: regular rate and rhythm, S1, S2 normal, no murmur, click, rub or sabrina  Neurologic: Mental status: Alert, oriented, thought content appropriate  Gait: Antalgic    Patient Care Team:  Raymond Berger MD as PCP - General    Advice/Referrals/Counseling   Education and counseling provided:  Are appropriate based on today's review and evaluation  End-of-Life planning (with patient's consent)  Pneumococcal Vaccine  Bone mass measurement (DEXA)      Assessment/Plan   Sarita Denton was seen today for follow-up. Diagnoses and all orders for this visit:    Uncontrolled type 2 diabetes mellitus with chronic kidney disease, with long-term current use of insulin, unspecified CKD stage  -     HM DIABETES FOOT EXAM  -     CBC WITH AUTOMATED DIFF  -     LIPID PANEL  -     METABOLIC PANEL, COMPREHENSIVE  -     MICROALBUMIN, UR, RAND W/ MICROALBUMIN/CREA RATIO    Routine general medical examination at a [facility] facility    Screening for alcoholism    Type 2 diabetes mellitus with other circulatory complication, with long-term current use of insulin ([facility]    . Advance Care Planning (ACP) Provider Conversation Snapshot    Date of ACP Conversation: 05/05/17  Persons included in Conversation:  patient  Length of ACP Conversation in minutes:  <16 minutes (Non-Billable)    Authorized Decision Maker (if patient is incapable of making informed decisions): This person is: Other Legally Authorized Decision Maker (e.g. Next of Kin)          For Patients with Decision Making Capacity:   Values/Goals: Exploration of values, goals, and preferences if recovery is not expected, even with continued medical treatment in the event of:  Imminent death  Severe, permanent brain injury  \"In these circumstances, what matters most to you? \"  Care focused more on comfort or quality of life.     Conversation Outcomes / Follow-Up Plan:   Recommended completion of Advance Directive form after review of ACP materials and conversation with prospective healthcare agent   Recommended communicating the plan and making copies for the healthcare agent, personal physician, and others as appropriate (e.g., health system)  Recommended review of completed ACP document annually or upon change in health status Diabetes controlled adequately      Hypertension controlled for age based on guidelines  Angina no symptoms  Diabetes controlled adequately      1. Routine general medical examination at a health care facility      2. Screening for alcoholism  none    3. Uncontrolled type 2 diabetes mellitus with chronic kidney disease, with long-term current use of insulin, unspecified CKD stage  Stable for now  -  DIABETES FOOT EXAM  - CBC WITH AUTOMATED DIFF  - LIPID PANEL  - METABOLIC PANEL, COMPREHENSIVE  - MICROALBUMIN, UR, RAND W/ MICROALBUMIN/CREA RATIO    4. Type 2 diabetes mellitus with other circulatory complication, with long-term current use of insulin ([address]  controll OK    5. Counseling regarding advanced directives  reviewed    6. Hypercholesterolemia  Lab Results   Component Value Date/Time    Cholesterol, total 153 04/07/2016 10:38 AM    HDL Cholesterol 44 04/07/2016 10:38 AM    LDL, calculated 69 04/07/2016 10:38 AM    VLDL, calculated 40 04/07/2016 10:38 AM    Triglyceride 200 04/07/2016 10:38 AM    CHOL/HDL Ratio 3.3 09/20/2010 10:33 AM     LDL < 70    7.  Coronary artery disease involving native coronary artery of native heart without angina pectoris  No  Angina or pnd

## 2017-05-05 NOTE — LETTER
5/8/2017 1:24 PM 
 
Ms. Reva Barajas [address] [facility] [id number] Dear Reva Barajas: 
 
Please find your most recent results below. Resulted Orders CBC WITH AUTOMATED DIFF Result Value Ref Range WBC 10.6 3.4 - 10.8 x10E3/uL  
 RBC 3.93 3.77 - 5.28 x10E6/uL HGB 12.5 11.1 - 15.9 g/dL HCT 37.9 34.0 - 46.6 % MCV 96 79 - 97 fL  
 MCH 31.8 26.6 - 33.0 pg  
 MCHC 33.0 31.5 - 35.7 g/dL  
 RDW 14.2 12.3 - 15.4 % PLATELET 772 214 - 428 x10E3/uL NEUTROPHILS 56 % Lymphocytes 32 % MONOCYTES 7 % EOSINOPHILS 4 % BASOPHILS 1 %  
 ABS. NEUTROPHILS 6.1 1.4 - 7.0 x10E3/uL Abs Lymphocytes 3.4 (H) 0.7 - 3.1 x10E3/uL  
 ABS. MONOCYTES 0.7 0.1 - 0.9 x10E3/uL  
 ABS. EOSINOPHILS 0.4 0.0 - 0.4 x10E3/uL  
 ABS. BASOPHILS 0.1 0.0 - 0.2 x10E3/uL IMMATURE GRANULOCYTES 0 %  
 ABS. IMM. GRANS. 0.0 0.0 - 0.1 x10E3/uL Hematology comments: Note:   
   Comment:  
   Verified by microscopic examination. Narrative Performed at:  [address]  [id number] : Janette Lozano MD, Phone:  [phone] LIPID PANEL Result Value Ref Range Cholesterol, total 145 100 - 199 mg/dL Triglyceride 192 (H) 0 - 149 mg/dL HDL Cholesterol 43 >39 mg/dL VLDL, calculated 38 5 - 40 mg/dL LDL, calculated 64 0 - 99 mg/dL Narrative Performed at:  [address]  [id number] : Janette Lozano MD, Phone:  [phone] METABOLIC PANEL, COMPREHENSIVE Result Value Ref Range Glucose 210 (H) 65 - 99 mg/dL BUN 22 8 - 27 mg/dL Creatinine 0.87 0.57 - 1.00 mg/dL GFR est non-AA 62 >59 mL/min/1.73 GFR est AA 71 >59 mL/min/1.73  
 BUN/Creatinine ratio 25 12 - 28 Sodium 140 134 - 144 mmol/L Potassium 5.0 3.5 - 5.2 mmol/L Chloride 101 96 - 106 mmol/L  
 CO2 20 18 - 29 mmol/L Calcium 9.9 8.7 - 10.3 mg/dL Protein, total 7.1 6.0 - 8.5 g/dL Albumin 4.2 3.5 - 4.7 g/dL GLOBULIN, TOTAL 2.9 1.5 - 4.5 g/dL A-G Ratio 1.4 1.2 - 2.2 Bilirubin, total 0.3 0.0 - 1.2 mg/dL Alk. phosphatase 50 39 - 117 IU/L  
 AST (SGOT) 15 0 - 40 IU/L  
 ALT (SGPT) 16 0 - 32 IU/L Narrative Performed at:  [address]  [id number] : Venita Graves MD, Phone:  [phone] MICROALBUMIN, UR, RAND W/ MICROALBUMIN/CREA RATIO Result Value Ref Range Creatinine, urine 37.1 Not Estab. mg/dL Microalbumin, urine 259.6 Not Estab. ug/mL Microalb/Creat ratio (ug/mg creat.) 699.7 (H) 0.0 - 30.0 mg/g creat Narrative Performed at:  [address]  [id number] : Venita Graves MD, Phone:  [phone] CVD REPORT Result Value Ref Range INTERPRETATION Note Comment:  
   Supplement report is available. Narrative Performed at:  [facility] [place] [address] [place] [address]  [id number] : Brandon Sunshine PhD, Phone:  [phone] DIABETES PATIENT EDUCATION Result Value Ref Range PDF Image Not applicable Narrative Performed at:  [facility] [place] [address] [place] [address]  [id number] : Brandon Heart PhD, Phone:  [phone] RECOMMENDATIONS: 
I have reviewed all lab results which are stable. Please call me if you have any questions: [phone] Sincerely, Ally Reyez MD

## 2017-05-06 LAB
ALBUMIN SERPL-MCNC: 4.2 G/DL (ref 3.5–4.7)
ALBUMIN/CREAT UR: 699.7 MG/G CREAT (ref 0–30)
ALBUMIN/GLOB SERPL: 1.4 {RATIO} (ref 1.2–2.2)
ALP SERPL-CCNC: 50 IU/L (ref 39–117)
ALT SERPL-CCNC: 16 IU/L (ref 0–32)
AST SERPL-CCNC: 15 IU/L (ref 0–40)
BASOPHILS # BLD AUTO: 0.1 X10E3/UL (ref 0–0.2)
BASOPHILS NFR BLD AUTO: 1 %
BILIRUB SERPL-MCNC: 0.3 MG/DL (ref 0–1.2)
BUN SERPL-MCNC: 22 MG/DL (ref 8–27)
BUN/CREAT SERPL: 25 (ref 12–28)
CALCIUM SERPL-MCNC: 9.9 MG/DL (ref 8.7–10.3)
CHLORIDE SERPL-SCNC: 101 MMOL/L (ref 96–106)
CHOLEST SERPL-MCNC: 145 MG/DL (ref 100–199)
CO2 SERPL-SCNC: 20 MMOL/L (ref 18–29)
CREAT SERPL-MCNC: 0.87 MG/DL (ref 0.57–1)
CREAT UR-MCNC: 37.1 MG/DL
EOSINOPHIL # BLD AUTO: 0.4 X10E3/UL (ref 0–0.4)
EOSINOPHIL NFR BLD AUTO: 4 %
ERYTHROCYTE [DISTWIDTH] IN BLOOD BY AUTOMATED COUNT: 14.2 % (ref 12.3–15.4)
GLOBULIN SER CALC-MCNC: 2.9 G/DL (ref 1.5–4.5)
GLUCOSE SERPL-MCNC: 210 MG/DL (ref 65–99)
HCT VFR BLD AUTO: 37.9 % (ref 34–46.6)
HDLC SERPL-MCNC: 43 MG/DL
HGB BLD-MCNC: 12.5 G/DL (ref 11.1–15.9)
IMM GRANULOCYTES # BLD: 0 X10E3/UL (ref 0–0.1)
IMM GRANULOCYTES NFR BLD: 0 %
INTERPRETATION, 910389: NORMAL
LDLC SERPL CALC-MCNC: 64 MG/DL (ref 0–99)
LYMPHOCYTES # BLD AUTO: 3.4 X10E3/UL (ref 0.7–3.1)
LYMPHOCYTES NFR BLD AUTO: 32 %
Lab: NORMAL
MCH RBC QN AUTO: 31.8 PG (ref 26.6–33)
MCHC RBC AUTO-ENTMCNC: 33 G/DL (ref 31.5–35.7)
MCV RBC AUTO: 96 FL (ref 79–97)
MICROALBUMIN UR-MCNC: 259.6 UG/ML
MONOCYTES # BLD AUTO: 0.7 X10E3/UL (ref 0.1–0.9)
MONOCYTES NFR BLD AUTO: 7 %
MORPHOLOGY BLD-IMP: ABNORMAL
NEUTROPHILS # BLD AUTO: 6.1 X10E3/UL (ref 1.4–7)
NEUTROPHILS NFR BLD AUTO: 56 %
PLATELET # BLD AUTO: 374 X10E3/UL (ref 150–379)
POTASSIUM SERPL-SCNC: 5 MMOL/L (ref 3.5–5.2)
PROT SERPL-MCNC: 7.1 G/DL (ref 6–8.5)
RBC # BLD AUTO: 3.93 X10E6/UL (ref 3.77–5.28)
SODIUM SERPL-SCNC: 140 MMOL/L (ref 134–144)
TRIGL SERPL-MCNC: 192 MG/DL (ref 0–149)
VLDLC SERPL CALC-MCNC: 38 MG/DL (ref 5–40)
WBC # BLD AUTO: 10.6 X10E3/UL (ref 3.4–10.8)

## 2017-05-08 NOTE — PROGRESS NOTES
Letter sent to the address on file to notify the patient per Dr Uli Montenegro that the labs are normal/stable. Asked for a return call to the office with any additional questions.

## 2017-05-09 RX ORDER — LOSARTAN POTASSIUM AND HYDROCHLOROTHIAZIDE 25; 100 MG/1; MG/1
TABLET ORAL
Qty: 90 TAB | Refills: 2 | Status: SHIPPED | OUTPATIENT
Start: 2017-05-09 | End: 2017-11-23

## 2017-05-17 RX ORDER — CARVEDILOL 25 MG/1
TABLET ORAL
Qty: 180 TAB | Refills: 3 | Status: SHIPPED | OUTPATIENT
Start: 2017-05-17 | End: 2018-08-04 | Stop reason: SDUPTHER

## 2017-06-16 RX ORDER — SIMVASTATIN 40 MG/1
TABLET, FILM COATED ORAL
Qty: 90 TAB | Refills: 1 | Status: SHIPPED | OUTPATIENT
Start: 2017-06-16 | End: 2017-11-06 | Stop reason: SDUPTHER

## 2017-06-21 RX ORDER — SERTRALINE HYDROCHLORIDE 25 MG/1
TABLET, FILM COATED ORAL
Qty: 90 TAB | Refills: 1 | Status: SHIPPED | OUTPATIENT
Start: 2017-06-21 | End: 2017-11-06 | Stop reason: SDUPTHER

## 2017-07-17 RX ORDER — GLIPIZIDE 2.5 MG/1
TABLET, EXTENDED RELEASE ORAL
Qty: 90 TAB | Refills: 1 | Status: SHIPPED | OUTPATIENT
Start: 2017-07-17 | End: 2017-11-23

## 2017-07-31 RX ORDER — FELODIPINE 10 MG/1
TABLET, EXTENDED RELEASE ORAL
Qty: 90 TAB | Refills: 1 | Status: SHIPPED | OUTPATIENT
Start: 2017-07-31 | End: 2017-12-04 | Stop reason: SDUPTHER

## 2017-08-17 ENCOUNTER — OFFICE VISIT (OUTPATIENT)
Dept: INTERNAL MEDICINE CLINIC | Age: 82
End: 2017-08-17

## 2017-08-17 VITALS
BODY MASS INDEX: 27.38 KG/M2 | RESPIRATION RATE: 18 BRPM | TEMPERATURE: 97.9 F | WEIGHT: 145 LBS | HEART RATE: 67 BPM | OXYGEN SATURATION: 96 % | HEIGHT: 61 IN | SYSTOLIC BLOOD PRESSURE: 143 MMHG | DIASTOLIC BLOOD PRESSURE: 64 MMHG

## 2017-08-17 DIAGNOSIS — I10 ESSENTIAL HYPERTENSION: ICD-10-CM

## 2017-08-17 DIAGNOSIS — I10 HTN (HYPERTENSION), BENIGN: ICD-10-CM

## 2017-08-17 DIAGNOSIS — Z74.09 MOBILITY IMPAIRED: ICD-10-CM

## 2017-08-17 DIAGNOSIS — I25.10 CORONARY ARTERY DISEASE INVOLVING NATIVE CORONARY ARTERY OF NATIVE HEART WITHOUT ANGINA PECTORIS: ICD-10-CM

## 2017-08-17 DIAGNOSIS — Z79.4 TYPE 2 DIABETES MELLITUS WITH OTHER CIRCULATORY COMPLICATION, WITH LONG-TERM CURRENT USE OF INSULIN (HCC): Primary | ICD-10-CM

## 2017-08-17 DIAGNOSIS — E11.59 TYPE 2 DIABETES MELLITUS WITH OTHER CIRCULATORY COMPLICATION, WITH LONG-TERM CURRENT USE OF INSULIN (HCC): Primary | ICD-10-CM

## 2017-08-17 DIAGNOSIS — R06.09 DYSPNEA ON EXERTION: ICD-10-CM

## 2017-08-17 RX ORDER — GABAPENTIN 100 MG/1
CAPSULE ORAL
Qty: 180 CAP | Refills: 3 | Status: SHIPPED | OUTPATIENT
Start: 2017-08-17 | End: 2017-08-18 | Stop reason: SDUPTHER

## 2017-08-17 NOTE — PROGRESS NOTES
Coordination of Care Questions    1. Have you been to the ER, urgent care clinic since your last visit? No       Hospitalized since your last visit? No    2. Have you seen or consulted any other health care providers outside of the [facility] since your last visit? Include any pap smears or colon screening.  No

## 2017-08-17 NOTE — MR AVS SNAPSHOT
Visit Information Date & Time Provider Department Dept. Phone Encounter #  
 8/17/2017  1:45 PM Ivone Patel, [address] [facility] [phone] [id number] Your Appointments 11/13/2017  2:00 PM  
ROUTINE CARE with Ivone Patel MD  
[facility] [address]) Appt Note: 3 MONTH F/U  
 [place] [address] [facility] [address]  
[address] [facility] [address] [facility] [id number] Upcoming Health Maintenance Date Due DTaP/Tdap/Td series (1 - Tdap) 12/3/1954 EYE EXAM RETINAL OR DILATED Q1 4/30/2014 GLAUCOMA SCREENING Q2Y 4/30/2015 HEMOGLOBIN A1C Q6M 7/3/2017 INFLUENZA AGE 9 TO ADULT 8/1/2017 FOOT EXAM Q1 5/5/2018 MICROALBUMIN Q1 5/5/2018 LIPID PANEL Q1 5/5/2018 MEDICARE YEARLY EXAM 5/6/2018 Allergies as of 8/17/2017  Review Complete On: 8/17/2017 By: Roxi Tripp LPN No Known Allergies Current Immunizations  Reviewed on 5/5/2017 Name Date Influenza High Dose Vaccine PF 11/9/2015, 11/3/2014 Influenza Vaccine 9/12/2013 Influenza Vaccine Split 9/25/2012, 9/27/2011, 12/20/2010 Pneumococcal Conjugate (PCV-13) 11/1/2016 Pneumococcal Vaccine (Unspecified Type) 11/3/2014 ZZZ-RETIRED (DO NOT USE) Pneumococcal Vaccine (Unspecified Type) 1/2/2002 Zoster Vaccine, Live 9/15/2016 Not reviewed this visit You Were Diagnosed With   
  
 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  
 27.4 kg/m 2 1.68 m 2 Preferred Pharmacy Pharmacy Name Phone [facility] [address] Your Updated Medication List  
  
   
This list is accurate as of: 8/17/17  2:18 PM.  Always use your most recent med list.  
  
  
  
  
 aspirin 81 mg tablet Take  by mouth. carvedilol 25 mg tablet Commonly known as:  COREG  
TAKE 1 TABLET TWICE DAILY WITH MEALS  
  
 felodipine 10 mg 24 hr tablet Commonly known as:  PLENDIL SR  
TAKE 1 TABLET EVERY DAY  
  
 gabapentin 100 mg capsule Commonly known as:  NEURONTIN  
TAKE 1 TO 2 CAPSULES AT BEDTIME  
  
 glipiZIDE SR 2.5 mg CR tablet Commonly known as:  GLUCOTROL XL  
TAKE 1 TABLET EVERY DAY (NEW DOSE) * insulin detemir 100 unit/mL (3 mL) Inpn Commonly known as:  LEVEMIR FLEXTOUCH  
0.45 mL by SubCUTAneous route nightly. * insulin detemir 100 unit/mL (3 mL) Inpn Commonly known as:  LEVEMIR FLEXTOUCH  
45 units HS  
  
 * Insulin Needles (Disposable) 31 gauge x 5/16\" Ndle Commonly known as:  BD INSULIN PEN NEEDLE UF SHORT Daily * PEN NEEDLE 31 gauge x 5/16\" Ndle Generic drug:  Insulin Needles (Disposable) USE DAILY losartan-hydroCHLOROthiazide 100-25 mg per tablet Commonly known as:  HYZAAR  
TAKE 1 TABLET EVERY DAY  
  
 metFORMIN 1,000 mg tablet Commonly known as:  GLUCOPHAGE  
TAKE 1 TABLET TWICE DAILY ( WITH MEALS ) AS DIRECTED  
  
 sertraline 25 mg tablet Commonly known as:  ZOLOFT  
TAKE 1 TABLET EVERY DAY  
  
 simvastatin 40 mg tablet Commonly known as:  ZOCOR  
TAKE 1 TABLET EVERY NIGHT  
  
 spironolactone 25 mg tablet Commonly known as:  ALDACTONE Take 1 Tab by mouth daily. VITAMIN D3 1,000 unit tablet Generic drug:  cholecalciferol Take  by mouth daily. * Notice:   This list has 4 medication(s) that are the same as other medications prescribed for you. Read the directions carefully, and ask your doctor or other care provider to review them with you. Prescriptions Sent to Pharmacy Refills  
 insulin detemir (LEVEMIR FLEXTOUCH) 100 unit/mL (3 mL) inpn 3 Si units HS Class: Normal  
 Pharmacy: [address] Ph #: [phone]  
 gabapentin (NEURONTIN) 100 mg capsule 3 Sig: TAKE 1 TO 2 CAPSULES AT BEDTIME Class: Normal  
 Pharmacy: [id number] [facility] Ph #: [phone] We Performed the Following AMB POC HEMOGLOBIN A1C [15915 CPT(R)] Introducing [facility]! Nasir Frank introduces [employer] patient portal. Now you can access parts of your medical record, email your doctor's office, and request medication refills online. 1. In your internet browser, go to [email or web address] 2. Click on the First Time User? Click Here link in the Sign In box. You will see the New Member Sign Up page. 3. Enter your [employer] Access Code exactly as it appears below. You will not need to use this code after youve completed the sign-up process. If you do not sign up before the expiration date, you must request a new code. · [employer] Access Code: [email or web address] Expires: 11/15/2017  2:18 PM 
 
4. Enter the last four digits of your Social Security Number (xxxx) and Date of Birth (mm/dd/yyyy) as indicated and click Submit. You will be taken to the next sign-up page. 5. Create a [employer] ID. This will be your [employer] login ID and cannot be changed, so think of one that is secure and easy to remember. 6. Create a [employer] password. You can change your password at any time. 7. Enter your Password Reset Question and Answer. This can be used at a later time if you forget your password. 8. Enter your e-mail address. You will receive e-mail notification when new information is available in [address] 9. Click Sign Up. You can now view and download portions of your medical record. 10. Click the Download Summary menu link to download a portable copy of your medical information. If you have questions, please visit the Frequently Asked Questions section of the [employer] website. Remember, [employer] is NOT to be used for urgent needs. For medical emergencies, dial 911. Now available from your iPhone and Android! Please provide this summary of care documentation to your next provider. Your primary care clinician is listed as Caridad Fry. If you have any questions after today's visit, please call [phone].

## 2017-08-17 NOTE — PROGRESS NOTES
Chief Complaint   Patient presents with    Diabetes     Subjective:   Anahi Chang is a 80 y.o. female  here for follow up of chronic medical conditions listed   Patient Active Problem List    Diagnosis Date Noted    Advanced directives, counseling/discussion 05/05/2017    Type 2 diabetes mellitus with circulatory disorder, with long-term current use of insulin [facility] 10/05/2016    HTN (hypertension), benign 10/05/2016    Coronary artery disease involving native coronary artery of native heart without angina pectoris 04/07/2016    Urinary incontinence without sensory awareness 04/07/2016    Type 2 diabetes mellitus with circulatory disorder ([facility] [address] 12/12/2013    Mobility impaired 12/12/2013    Vitamin D deficiency 03/28/2012    Diabetes mellitus with renal manifestations, uncontrolled ([facility] 03/28/2012    Diabetes ([facility] 12/20/2010    CAD (coronary artery disease), native coronary artery 06/18/2010    Hypercholesterolemia 06/18/2010    REYMUNDO (obstructive sleep apnea) 06/18/2009     Current Outpatient Prescriptions   Medication Sig Dispense Refill    felodipine (PLENDIL SR) 10 mg 24 hr tablet TAKE 1 TABLET EVERY DAY 90 Tab 1    glipiZIDE SR (GLUCOTROL XL) 2.5 mg CR tablet TAKE 1 TABLET EVERY DAY (NEW DOSE) 90 Tab 1    sertraline (ZOLOFT) 25 mg tablet TAKE 1 TABLET EVERY DAY 90 Tab 1    simvastatin (ZOCOR) 40 mg tablet TAKE 1 TABLET EVERY NIGHT 90 Tab 1    carvedilol (COREG) 25 mg tablet TAKE 1 TABLET TWICE DAILY WITH MEALS 180 Tab 3    losartan-hydroCHLOROthiazide (HYZAAR) 100-25 mg per tablet TAKE 1 TABLET EVERY DAY 90 Tab 2    insulin detemir (LEVEMIR FLEXTOUCH) 100 unit/mL (3 mL) inpn 0.45 mL by SubCUTAneous route nightly. 15 Pen 1    spironolactone (ALDACTONE) 25 mg tablet Take 1 Tab by mouth daily.  90 Tab 1    metFORMIN (GLUCOPHAGE) 1,000 mg tablet TAKE 1 TABLET TWICE DAILY ( WITH MEALS ) AS DIRECTED 180 Tab 3    gabapentin (NEURONTIN) 100 mg capsule TAKE 1 TO 2 CAPSULES AT BEDTIME 180 Cap 3    cholecalciferol (VITAMIN D3) 1,000 unit tablet Take  by mouth daily.  LEVEMIR FLEXTOUCH 100 unit/mL (3 mL) inpn INJECT 40 UNITS SUBCUTANEOUSLY DAILY AS DIRECTED 15 Pen 3    PEN NEEDLE 31 X 5/16 \" ndle USE DAILY 100 Package 11    Insulin Needles, Disposable, (BD INSULIN PEN NEEDLE UF SHORT) 31 X 5/16 \" Ndle Daily 1 Package 11    aspirin 81 mg tablet Take  by mouth. Past Medical History:   Diagnosis Date    CAD (coronary artery disease)     CAD (coronary artery disease), native coronary artery 6/18/2010    Diabetes ([address] 12/20/2010    Hypercholesterolemia 6/18/2010    Hypertension 6/18/2010    Mobility impaired 12/12/2013    Use cane and as needed wheelchair and walker      REYMUNDO (obstructive sleep apnea) 6/18/2010     Past Surgical History:   Procedure Laterality Date    CARDIAC SURG PROCEDURE UNLIST  1989    cabg    CARDIAC SURG PROCEDURE UNLIST      stent    HX APPENDECTOMY      HX CHOLECYSTECTOMY      HX GYN     . She  is accompanied by patient  She lives as follows: alone and does have Advanced Directives. New Complaints today include: Diabetes   This 80 y.o. female is more than 17 years post coronary bypass surgery with some element of congestive heart failure. She lives most of the time in [place] trail. Her daughter owns the trail park so she sees her daughter a lot. She complains of being lonely there. She has not a lot of friends and she is not able to do a lot. She notes, and her daughter notes, that mom has had shortness of breath with exertion which is worse over the last three to six months. Seen ER 11?2016 in [place] given levaquin  750 for cap    She has no PND or orthopnea. Denies angina. Generally has been not as well. She has had a depressed mood. She has been somewhat sad, reserved, and withdrawn. She denies palpitation. Denies syncope or presyncope. She has not seen cardiology in a couple of years.   She has been advised on several current occasions by me, to see her Cardiologist, Dr. Jennifer Burroughs. She is very compliant with taking her medications. She is not compliant with her diet. She is not compliant with sleep CPAP. She has been told to have sleep apnea in the past and has never been compliant. She is not compliant with checking her blood sugars. She just does not check blood sugars. This is reviewed in detail with the family. Never saw cardiology  No er visits    Her blood pressure was elevated. In er  She feels good      Cardiovascular ROS: no chest pain or dyspnea on exertion  Neurological ROS: no TIA or stroke symptoms  General ROS: negative for - chills, fatigue, fever, malaise, night sweats or sleep disturbance  Endocrine ROS: negative for - hair pattern changes, hot flashes, malaise/lethargy, palpitations, polydipsia/polyuria, temperature intolerance or unexpected weight changes      Review of Systems  A comprehensive review of systems was negative except for: Behvioral/Psych: positive for depression    Geriatric Issues: Activities of Daily Living (ADLs):    She is independent in the following: ambulation, bathing and hygiene, feeding, continence, grooming, toileting and dressing  Requires assistance with the following: driving   food   No dementia    Outside reports reviewed: none.     Patient Active Problem List    Diagnosis Date Noted    Advanced directives, counseling/discussion 05/05/2017    Type 2 diabetes mellitus with circulatory disorder, with long-term current use of insulin ([address] 10/05/2016    HTN (hypertension), benign 10/05/2016    Coronary artery disease involving native coronary artery of native heart without angina pectoris 04/07/2016    Urinary incontinence without sensory awareness 04/07/2016    Type 2 diabetes mellitus with circulatory disorder ([address] 12/12/2013    Mobility impaired 12/12/2013    Vitamin D deficiency 03/28/2012    Diabetes mellitus with renal manifestations, uncontrolled ([address] 03/28/2012  Diabetes [facility] 12/20/2010    CAD (coronary artery disease), native coronary artery 06/18/2010    Hypercholesterolemia 06/18/2010    REYMUNDO (obstructive sleep apnea) 06/18/2009     Current Outpatient Prescriptions   Medication Sig Dispense Refill    felodipine (PLENDIL SR) 10 mg 24 hr tablet TAKE 1 TABLET EVERY DAY 90 Tab 1    glipiZIDE SR (GLUCOTROL XL) 2.5 mg CR tablet TAKE 1 TABLET EVERY DAY (NEW DOSE) 90 Tab 1    sertraline (ZOLOFT) 25 mg tablet TAKE 1 TABLET EVERY DAY 90 Tab 1    simvastatin (ZOCOR) 40 mg tablet TAKE 1 TABLET EVERY NIGHT 90 Tab 1    carvedilol (COREG) 25 mg tablet TAKE 1 TABLET TWICE DAILY WITH MEALS 180 Tab 3    losartan-hydroCHLOROthiazide (HYZAAR) 100-25 mg per tablet TAKE 1 TABLET EVERY DAY 90 Tab 2    insulin detemir (LEVEMIR FLEXTOUCH) 100 unit/mL (3 mL) inpn 0.45 mL by SubCUTAneous route nightly. 15 Pen 1    spironolactone (ALDACTONE) 25 mg tablet Take 1 Tab by mouth daily. 90 Tab 1    metFORMIN (GLUCOPHAGE) 1,000 mg tablet TAKE 1 TABLET TWICE DAILY ( WITH MEALS ) AS DIRECTED 180 Tab 3    gabapentin (NEURONTIN) 100 mg capsule TAKE 1 TO 2 CAPSULES AT BEDTIME 180 Cap 3    cholecalciferol (VITAMIN D3) 1,000 unit tablet Take  by mouth daily.  LEVEMIR FLEXTOUCH 100 unit/mL (3 mL) inpn INJECT 40 UNITS SUBCUTANEOUSLY DAILY AS DIRECTED 15 Pen 3    PEN NEEDLE 31 X 5/16 \" ndle USE DAILY 100 Package 11    Insulin Needles, Disposable, (BD INSULIN PEN NEEDLE UF SHORT) 31 X 5/16 \" Ndle Daily 1 Package 11    aspirin 81 mg tablet Take  by mouth.        Past Medical History:   Diagnosis Date    CAD (coronary artery disease)     CAD (coronary artery disease), native coronary artery 6/18/2010    Diabetes ([facility] 12/20/2010    Hypercholesterolemia 6/18/2010    Hypertension 6/18/2010    Mobility impaired 12/12/2013    Use cane and as needed wheelchair and walker      REYMUNDO (obstructive sleep apnea) 6/18/2010     Past Surgical History:   Procedure Laterality Date    CARDIAC SURG PROCEDURE UNLIST  1989    cabg    CARDIAC SURG PROCEDURE UNLIST      stent    HX APPENDECTOMY      HX CHOLECYSTECTOMY      HX GYN       Family History   Problem Relation Age of Onset    Heart Disease Mother     Heart Disease Father     Diabetes Brother         Vitals:    08/17/17 1326 08/17/17 1401   BP: 178/67 143/64   Pulse: 67    Resp: 18    Temp: 97.9 °F (36.6 °C)    TempSrc: Oral    SpO2: 96%    Weight: 145 lb (65.8 kg)    Height: 5' 1\" (1.549 m)        Objective:     Visit Vitals    /64  Comment: sitting    Pulse 67    Temp 97.9 °F (36.6 °C) (Oral)    Resp 18    Ht 5' 1\" (1.549 m)    Wt 145 lb (65.8 kg)    SpO2 96%    BMI 27.4 kg/m2     Visit Vitals    /64  Comment: sitting    Pulse 67    Temp 97.9 °F (36.6 °C) (Oral)    Resp 18    Ht 5' 1\" (1.549 m)    Wt 145 lb (65.8 kg)    SpO2 96%    BMI 27.4 kg/m2     General appearance: alert, cooperative, no distress, appears stated age  Neck: supple, symmetrical, trachea midline, no adenopathy, thyroid: not enlarged, symmetric, no tenderness/mass/nodules, no carotid bruit and no JVD  Lungs: clear to auscultation bilaterally  Heart: normal apical impulse, regular rate and rhythm, S4 present  Abdomen: soft, non-tender.  Bowel sounds normal. No masses,  no organomegaly  Neurologic: Grossly normal  Balance poor  Uses cane  Mood normal  Imaging      Lab Review  Lab Results   Component Value Date/Time    Hemoglobin A1c 7.3 03/22/2011 12:01 PM    Hemoglobin A1c 7.6 12/20/2010 12:16 PM    Hemoglobin A1c 8.8 09/20/2010 10:49 AM    Glucose 210 05/05/2017 11:48 AM    Microalbumin/Creat ratio (mg/g creat) 24 06/18/2010 10:04 AM    Microalb/Creat ratio (ug/mg creat.) 699.7 05/05/2017 11:48 AM    Microalbumin,urine random 2.66 06/18/2010 10:04 AM    LDL, calculated 64 05/05/2017 11:48 AM    Creatinine 0.87 05/05/2017 11:48 AM      Lab Results   Component Value Date/Time    Cholesterol, total 145 05/05/2017 11:48 AM    HDL Cholesterol 43 05/05/2017 11:48 AM    LDL, calculated 64 2017 11:48 AM    Triglyceride 192 2017 11:48 AM    CHOL/HDL Ratio 3.3 2010 10:33 AM       Lab Results   Component Value Date/Time    GFR est AA 71 2017 11:48 AM    GFR est non-AA 62 2017 11:48 AM    Creatinine 0.87 2017 11:48 AM    BUN 22 2017 11:48 AM    Sodium 140 2017 11:48 AM    Potassium 5.0 2017 11:48 AM    Chloride 101 2017 11:48 AM    CO2 20 2017 11:48 AM         Assessment/Plan:         High risk medications reviewed    Concern for worsening cad  But no symptoms    Diabetes controlled adequately    Reinforce good nutrition and not skippiong meals    Hypertension borderline control      1. Type 2 diabetes mellitus with other circulatory complication, with long-term current use of insulin ([facility] [address]  Results for orders placed or performed in visit on 17   AMB POC HEMOGLOBIN A1C   Result Value Ref Range    Hemoglobin A1c (POC) 7.8 %     Fair control  - AMB POC HEMOGLOBIN A1C    2. HTN (hypertension), benign  borderline    3. Coronary artery disease involving native coronary artery of native heart without angina pectoris  No symptoms              Fall risk is high due to aging factors and multiple other medical issues    Fall prevention discussed and methods to reduce falls and injuries are reviewed in detail    Stable status on multiple high risk medications for multiple comorbidities, will not change any of the present treatment plans    Requested Prescriptions     Signed Prescriptions Disp Refills    insulin detemir (LEVEMIR FLEXTOUCH) 100 unit/mL (3 mL) inpn 15 Pen 3     Si units HS    gabapentin (NEURONTIN) 100 mg capsule 180 Cap 3     Sig: TAKE 1 TO 2 CAPSULES AT BEDTIME     1. Type 2 diabetes mellitus with other circulatory complication, with long-term current use of insulin ([facility])    - AMB POC HEMOGLOBIN A1C    2. HTN (hypertension), benign      3.  Coronary artery disease involving native coronary artery of native heart without angina pectoris      4. Mobility impaired  Reviewed exercises    5. Dyspnea on exertion  Chronic not at rest    6.  Essential hypertension  Borderline    High risk medications reviewed

## 2017-08-18 LAB — HBA1C MFR BLD HPLC: 7.8 %

## 2017-08-18 RX ORDER — GABAPENTIN 100 MG/1
CAPSULE ORAL
Qty: 180 CAP | Refills: 3 | Status: SHIPPED | OUTPATIENT
Start: 2017-08-18 | End: 2017-11-22 | Stop reason: DRUGHIGH

## 2017-08-18 NOTE — TELEPHONE ENCOUNTER
Medication was sent to [address] [place] [address] Street patient would like all medication sent to [facility] mail order  gabapentin (NEURONTIN) 100 mg capsule and insulin detemir (LEVEMIR FLEXTOUCH) 100 unit/mL   (3 mL) inpn

## 2017-08-28 ENCOUNTER — OFFICE VISIT (OUTPATIENT)
Dept: INTERNAL MEDICINE CLINIC | Age: 82
End: 2017-08-28

## 2017-08-28 VITALS
WEIGHT: 145 LBS | HEART RATE: 55 BPM | TEMPERATURE: 98 F | BODY MASS INDEX: 27.38 KG/M2 | RESPIRATION RATE: 20 BRPM | HEIGHT: 61 IN | DIASTOLIC BLOOD PRESSURE: 62 MMHG | SYSTOLIC BLOOD PRESSURE: 141 MMHG | OXYGEN SATURATION: 91 %
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
## 2017-08-28 DIAGNOSIS — R05.9 COUGH: Primary | ICD-10-CM

## 2017-08-28 RX ORDER — TRAMADOL HYDROCHLORIDE 50 MG/1
TABLET ORAL
COMMUNITY
Start: 2017-08-26 | End: 2017-11-22

## 2017-08-28 NOTE — PATIENT INSTRUCTIONS
[employer] Activation    Thank you for requesting access to [employer]. Please follow the instructions below to securely access and download your online medical record. [employer] allows you to send messages to your doctor, view your test results, renew your prescriptions, schedule appointments, and more. How Do I Sign Up? 1. In your internet browser, go to [email or web address]  2. Click on the First Time User? Click Here link in the Sign In box. You will be redirect to the New Member Sign Up page. 3. Enter your [employer] Access Code exactly as it appears below. You will not need to use this code after youve completed the sign-up process. If you do not sign up before the expiration date, you must request a new code. [employer] Access Code: [email or web address]  Expires: 11/15/2017  2:18 PM (This is the date your [employer] access code will )    4. Enter the last four digits of your Social Security Number (xxxx) and Date of Birth (mm/dd/yyyy) as indicated and click Submit. You will be taken to the next sign-up page. 5. Create a [employer] ID. This will be your [employer] login ID and cannot be changed, so think of one that is secure and easy to remember. 6. Create a [employer] password. You can change your password at any time. 7. Enter your Password Reset Question and Answer. This can be used at a later time if you forget your password. 8. Enter your e-mail address. You will receive e-mail notification when new information is available in [address] 9. Click Sign Up. You can now view and download portions of your medical record. 10. Click the Download Summary menu link to download a portable copy of your medical information. Additional Information    If you have questions, please visit the Frequently Asked Questions section of the [employer] website at [email or web address] Remember, [employer] is NOT to be used for urgent needs. For medical emergencies, dial 911.

## 2017-08-28 NOTE — PROGRESS NOTES
HISTORY OF PRESENT ILLNESS  Soledad Andrade is a 80 y.o. female. HPI  Patient presents to the office to follow up a fall from August 26 th and to check her cough. She reports her new kitten was in her cabinets and as she went to get him out of the cabinet she lost her footing. She reports she did not hit her head but instead landed on her buttocks and her left arm. She was taken by ambulance to [facility] in [place]. She reports she did have some imaging done and was told she only had bruising. She was prescribed tramadol. Since then she has continued to have back pain. She also has a cough. She denies fever. The cough is not very productive. She is not wheezing. Her daughter reports this is the first day she seems to have her color back. She is concerned because he mother is not taking deep breaths. Also her daughter reports her mom has been having some fullness of her ears. She denies pain. Review of Systems   Constitutional: Negative for chills and fever. Musculoskeletal: Positive for back pain. Blood pressure 141/62, pulse (!) 55, temperature 98 °F (36.7 °C), temperature source Oral, resp. rate 20, height 5' 1\" (1.549 m), weight 145 lb (65.8 kg), SpO2 91 %. Physical Exam   Constitutional: No distress. HENT:   TM's intact- no erythema noted. no facial pain. Cardiovascular: Normal rate and regular rhythm. No murmur heard. Pulmonary/Chest: Effort normal and breath sounds normal. She has no wheezes. No current wheeze. Skin:   Ecchymosis noted of the left arm just above her elbow. Increase pain of her lumbar with movement. Patient is resting in her wheelchair. ASSESSMENT and PLAN  Diagnoses and all orders for this visit:    1. Cough    2. Fall, subsequent encounter    I have advised the patient to get otc mucinex and flonase. I will speak to Dr. Vick Pena about her taking a few days of a NSAID for pain.  She understands that it is very important that she take deep breaths throughout the day. Also she may take tylenol during the day instead of the tramadol since this tends to make her sleepy. I will have Tasneem Haywood LPN to call to get the report from the hospital. I am trying to see all imaging that was done. This was all discussed with the patient and her daughter. They agree and understand the plan.

## 2017-08-28 NOTE — PROGRESS NOTES
Reviewed record in preparation for visit and have obtained necessary documentation. Identified pt with two pt identifiers(name and ). Health Maintenance Due   Topic    DTaP/Tdap/Td series (1 - Tdap)    EYE EXAM RETINAL OR DILATED Q1     GLAUCOMA SCREENING Q2Y     INFLUENZA AGE 9 TO ADULT          No chief complaint on file. Wt Readings from Last 3 Encounters:   17 145 lb (65.8 kg)   17 145 lb (65.8 kg)   17 148 lb (67.1 kg)     Temp Readings from Last 3 Encounters:   17 98 °F (36.7 °C) (Oral)   17 97.9 °F (36.6 °C) (Oral)   17 97.7 °F (36.5 °C) (Oral)     BP Readings from Last 3 Encounters:   17 143/64   17 170/74   17 150/60     Pulse Readings from Last 3 Encounters:   17 67   17 70   17 60           Learning Assessment:  :     Learning Assessment 2014   PRIMARY LEARNER Patient   PRIMARY LANGUAGE ENGLISH   LEARNER PREFERENCE PRIMARY DEMONSTRATION   ANSWERED BY patient   RELATIONSHIP SELF       Depression Screening:  :     PHQ over the last two weeks 2017   Little interest or pleasure in doing things Not at all   Feeling down, depressed or hopeless Not at all   Total Score PHQ 2 0       Fall Risk Assessment:  :     Fall Risk Assessment, last 12 mths 2017   Able to walk? Yes   Fall in past 12 months? No   Fall with injury? -   Number of falls in past 12 months -   Fall Risk Score -       Abuse Screening:  :     Abuse Screening Questionnaire 2014   Do you ever feel afraid of your partner? N   Are you in a relationship with someone who physically or mentally threatens you? N   Is it safe for you to go home? Y       Coordination of Care Questionnaire:  :     1) Have you been to an emergency room, urgent care clinic since your last visit?  yes  Hospitalized since your last visit? no             2) Have you seen or consulted any other health care providers outside of [address] [place] [address] since your last visit? yes  (Include any pap smears or colon screenings in this section.)    3) Do you have an Advance Directive on file? no    4) Are you interested in receiving information on Advance Directives? YES      Patient is accompanied by self I have received verbal consent from Ernestina Medeiros to discuss any/all medical information while they are present in the room.

## 2017-08-28 NOTE — MR AVS SNAPSHOT
Visit Information Date & Time Provider Department Dept. Phone Encounter #  
 8/28/2017  2:40 PM Amelie Angeles PA-C [facility] [phone] [id number] Your Appointments 11/22/2017 11:00 AM  
ROUTINE CARE with Linda Guerra MD  
[facility] [address]) Appt Note: Follow up visit [place] [address] [facility], [id number] [place] [address] [address] [facility] [address] [facility] [address] [address] Upcoming Health Maintenance Date Due DTaP/Tdap/Td series (1 - Tdap) [birth year]3/1954 EYE EXAM RETINAL OR DILATED Q1 4/30/2014 GLAUCOMA SCREENING Q2Y 4/30/2015 INFLUENZA AGE 9 TO ADULT 8/1/2017 HEMOGLOBIN A1C Q6M 2/17/2018 FOOT EXAM Q1 5/5/2018 MICROALBUMIN Q1 5/5/2018 LIPID PANEL Q1 5/5/2018 MEDICARE YEARLY EXAM 5/6/2018 Allergies as of 8/28/2017  In Progress On: 8/28/2017 By: Anju Whitehead LPN No Known Allergies Current Immunizations  Reviewed on 5/5/2017 Name Date Influenza High Dose Vaccine PF 11/9/2015, 11/3/2014 Influenza Vaccine 9/12/2013 Influenza Vaccine Split 9/25/2012, 9/27/2011, 12/20/2010 Pneumococcal Conjugate (PCV-13) 11/1/2016 Pneumococcal Vaccine (Unspecified Type) 11/3/2014 ZZZ-RETIRED (DO NOT USE) Pneumococcal Vaccine (Unspecified Type) 1/2/2002 Zoster Vaccine, Live 9/15/2016 Not reviewed this visit Vitals BP Pulse Temp Resp Height(growth percentile) Weight(growth percentile) 141/62 (!) 55 98 °F (36.7 °C) (Oral) 20 5' 1\" (1.549 m) 145 lb (65.8 kg) SpO2 BMI OB Status Smoking Status 91% 27.4 kg/m2 Hysterectomy Never Smoker Vitals History BMI and BSA Data Body Mass Index Body Surface Area  
 27.4 kg/m 2 1.68 m 2 Preferred Pharmacy Pharmacy Name Phone [address] [place] [address] [address] [facility] [address] [phone] Your Updated Medication List  
  
   
 This list is accurate as of: 8/28/17  3:41 PM.  Always use your most recent med list.  
  
  
  
  
 aspirin 81 mg tablet Take  by mouth. carvedilol 25 mg tablet Commonly known as:  COREG  
TAKE 1 TABLET TWICE DAILY WITH MEALS  
  
 felodipine 10 mg 24 hr tablet Commonly known as:  PLENDIL SR  
TAKE 1 TABLET EVERY DAY  
  
 gabapentin 100 mg capsule Commonly known as:  NEURONTIN  
TAKE 1 TO 2 CAPSULES AT BEDTIME  
  
 glipiZIDE SR 2.5 mg CR tablet Commonly known as:  GLUCOTROL XL  
TAKE 1 TABLET EVERY DAY (NEW DOSE) insulin detemir 100 unit/mL (3 mL) Inpn Commonly known as:  LEVEMIR FLEXTOUCH  
45 Units by SubCUTAneous route nightly. * Insulin Needles (Disposable) 31 gauge x 5/16\" Ndle Commonly known as:  BD INSULIN PEN NEEDLE UF SHORT Daily * PEN NEEDLE 31 gauge x 5/16\" Ndle Generic drug:  Insulin Needles (Disposable) USE DAILY losartan-hydroCHLOROthiazide 100-25 mg per tablet Commonly known as:  HYZAAR  
TAKE 1 TABLET EVERY DAY  
  
 metFORMIN 1,000 mg tablet Commonly known as:  GLUCOPHAGE  
TAKE 1 TABLET TWICE DAILY ( WITH MEALS ) AS DIRECTED  
  
 sertraline 25 mg tablet Commonly known as:  ZOLOFT  
TAKE 1 TABLET EVERY DAY  
  
 simvastatin 40 mg tablet Commonly known as:  ZOCOR  
TAKE 1 TABLET EVERY NIGHT  
  
 spironolactone 25 mg tablet Commonly known as:  ALDACTONE Take 1 Tab by mouth daily. traMADol 50 mg tablet Commonly known as:  ULTRAM  
  
 VITAMIN D3 1,000 unit tablet Generic drug:  cholecalciferol Take  by mouth daily. * Notice: This list has 2 medication(s) that are the same as other medications prescribed for you. Read the directions carefully, and ask your doctor or other care provider to review them with you. Patient Instructions Artspacehart Activation Thank you for requesting access to tradeNOW. Please follow the instructions below to securely access and download your online medical record.  tradeNOW allows you to send messages to your doctor, view your test results, renew your prescriptions, schedule appointments, and more. How Do I Sign Up? 1. In your internet browser, go to [email or web address] 
2. Click on the First Time User? Click Here link in the Sign In box. You will be redirect to the New Member Sign Up page. 3. Enter your [employer] Access Code exactly as it appears below. You will not need to use this code after youve completed the sign-up process. If you do not sign up before the expiration date, you must request a new code. [employer] Access Code: [email or web address] Expires: 11/15/2017  2:18 PM (This is the date your [employer] access code will ) 4. Enter the last four digits of your Social Security Number (xxxx) and Date of Birth (mm/dd/yyyy) as indicated and click Submit. You will be taken to the next sign-up page. 5. Create a [employer] ID. This will be your [employer] login ID and cannot be changed, so think of one that is secure and easy to remember. 6. Create a [employer] password. You can change your password at any time. 7. Enter your Password Reset Question and Answer. This can be used at a later time if you forget your password. 8. Enter your e-mail address. You will receive e-mail notification when new information is available in [address] 9. Click Sign Up. You can now view and download portions of your medical record. 10. Click the Download Summary menu link to download a portable copy of your medical information. Additional Information If you have questions, please visit the Frequently Asked Questions section of the [employer] website at [email or web address] Remember, [employer] is NOT to be used for urgent needs. For medical emergencies, dial 911. Introducing [facility]! [facility] introduces [employer] patient portal. Now you can access parts of your medical record, email your doctor's office, and request medication refills online. 1. In your internet browser, go to [email or web address] 2. Click on the First Time User? Click Here link in the Sign In box. You will see the New Member Sign Up page. 3. Enter your SafetyTat Access Code exactly as it appears below. You will not need to use this code after youve completed the sign-up process. If you do not sign up before the expiration date, you must request a new code. · SafetyTat Access Code: [email or web address] Expires: 11/15/2017  2:18 PM 
 
4. Enter the last four digits of your Social Security Number (xxxx) and Date of Birth (mm/dd/yyyy) as indicated and click Submit. You will be taken to the next sign-up page. 5. Create a dotCloudt ID. This will be your SafetyTat login ID and cannot be changed, so think of one that is secure and easy to remember. 6. Create a SafetyTat password. You can change your password at any time. 7. Enter your Password Reset Question and Answer. This can be used at a later time if you forget your password. 8. Enter your e-mail address. You will receive e-mail notification when new information is available in [address] 9. Click Sign Up. You can now view and download portions of your medical record. 10. Click the Download Summary menu link to download a portable copy of your medical information. If you have questions, please visit the Frequently Asked Questions section of the SafetyTat website. Remember, SafetyTat is NOT to be used for urgent needs. For medical emergencies, dial 911. Now available from your iPhone and Android! Please provide this summary of care documentation to your next provider. Your primary care clinician is listed as Doreatha Galeazzi. If you have any questions after today's visit, please call [phone].

## 2017-11-01 RX ORDER — SPIRONOLACTONE 25 MG/1
TABLET ORAL
Qty: 90 TAB | Refills: 1 | Status: SHIPPED | OUTPATIENT
Start: 2017-11-01 | End: 2018-10-25 | Stop reason: SDUPTHER

## 2017-11-01 RX ORDER — METFORMIN HYDROCHLORIDE 1000 MG/1
TABLET ORAL
Qty: 180 TAB | Refills: 3 | Status: SHIPPED | OUTPATIENT
Start: 2017-11-01 | End: 2017-11-23

## 2017-11-07 RX ORDER — SIMVASTATIN 40 MG/1
TABLET, FILM COATED ORAL
Qty: 90 TAB | Refills: 1 | Status: SHIPPED | OUTPATIENT
Start: 2017-11-07 | End: 2018-07-21 | Stop reason: SDUPTHER

## 2017-11-07 RX ORDER — SERTRALINE HYDROCHLORIDE 25 MG/1
TABLET, FILM COATED ORAL
Qty: 90 TAB | Refills: 1 | Status: SHIPPED | OUTPATIENT
Start: 2017-11-07 | End: 2018-08-04 | Stop reason: SDUPTHER

## 2017-11-22 ENCOUNTER — APPOINTMENT (OUTPATIENT)
Dept: CT IMAGING | Age: 82
End: 2017-11-22
Attending: PHYSICIAN ASSISTANT
Payer: MEDICARE

## 2017-11-22 ENCOUNTER — HOSPITAL ENCOUNTER (INPATIENT)
Age: 82
LOS: 1 days | Discharge: HOME | DRG: 087 | End: 2017-11-23
Attending: EMERGENCY MEDICINE | Admitting: HOSPITALIST
Payer: MEDICARE

## 2017-11-22 ENCOUNTER — HOSPITAL ENCOUNTER (EMERGENCY)
Age: 82
Discharge: OTHER INSTITUTION | End: 2017-11-22
Attending: EMERGENCY MEDICINE
Payer: MEDICARE

## 2017-11-22 VITALS
HEIGHT: 61 IN | HEART RATE: 59 BPM | BODY MASS INDEX: 27.75 KG/M2 | TEMPERATURE: 97.8 F | WEIGHT: 147 LBS | RESPIRATION RATE: 16 BRPM | OXYGEN SATURATION: 94 % | DIASTOLIC BLOOD PRESSURE: 70 MMHG | SYSTOLIC BLOOD PRESSURE: 155 MMHG
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
## 2017-11-22 DIAGNOSIS — I60.9 SAH (SUBARACHNOID HEMORRHAGE) (HCC): Primary | ICD-10-CM

## 2017-11-22 DIAGNOSIS — S06.5XAA SDH (SUBDURAL HEMATOMA): ICD-10-CM

## 2017-11-22 DIAGNOSIS — I60.9 SUBARACHNOID BLEED (HCC): Primary | ICD-10-CM

## 2017-11-22 LAB
ANION GAP SERPL CALC-SCNC: 12 MMOL/L (ref 5–15)
BASOPHILS # BLD: 0 K/UL (ref 0–0.1)
BASOPHILS NFR BLD: 0 % (ref 0–1)
BUN SERPL-MCNC: 19 MG/DL (ref 6–20)
BUN/CREAT SERPL: 23 (ref 12–20)
CALCIUM SERPL-MCNC: 9.6 MG/DL (ref 8.5–10.1)
CHLORIDE SERPL-SCNC: 105 MMOL/L (ref 97–108)
CO2 SERPL-SCNC: 25 MMOL/L (ref 21–32)
CREAT SERPL-MCNC: 0.82 MG/DL (ref 0.55–1.02)
EOSINOPHIL # BLD: 0.3 K/UL (ref 0–0.4)
EOSINOPHIL NFR BLD: 2 % (ref 0–7)
ERYTHROCYTE [DISTWIDTH] IN BLOOD BY AUTOMATED COUNT: 13.6 % (ref 11.5–14.5)
GLUCOSE BLD STRIP.AUTO-MCNC: 182 MG/DL (ref 65–100)
GLUCOSE SERPL-MCNC: 117 MG/DL (ref 65–100)
HCT VFR BLD AUTO: 39.1 % (ref 35–47)
HGB BLD-MCNC: 13 G/DL (ref 11.5–16)
INR PPP: 1.1 (ref 0.9–1.1)
LYMPHOCYTES # BLD: 2.1 K/UL (ref 0.8–3.5)
LYMPHOCYTES NFR BLD: 17 % (ref 12–49)
MCH RBC QN AUTO: 31.9 PG (ref 26–34)
MCHC RBC AUTO-ENTMCNC: 33.2 G/DL (ref 30–36.5)
MCV RBC AUTO: 96.1 FL (ref 80–99)
MONOCYTES # BLD: 0.7 K/UL (ref 0–1)
MONOCYTES NFR BLD: 6 % (ref 5–13)
NEUTS SEG # BLD: 9.2 K/UL (ref 1.8–8)
NEUTS SEG NFR BLD: 75 % (ref 32–75)
PLATELET # BLD AUTO: 358 K/UL (ref 150–400)
POTASSIUM SERPL-SCNC: 4.2 MMOL/L (ref 3.5–5.1)
PROTHROMBIN TIME: 10.7 SEC (ref 9–11.1)
RBC # BLD AUTO: 4.07 M/UL (ref 3.8–5.2)
SERVICE CMNT-IMP: ABNORMAL
SODIUM SERPL-SCNC: 142 MMOL/L (ref 136–145)
WBC # BLD AUTO: 12.3 K/UL (ref 3.6–11)

## 2017-11-22 PROCEDURE — 90715 TDAP VACCINE 7 YRS/> IM: CPT | Performed by: PHYSICIAN ASSISTANT

## 2017-11-22 PROCEDURE — 80048 BASIC METABOLIC PNL TOTAL CA: CPT | Performed by: PHYSICIAN ASSISTANT

## 2017-11-22 PROCEDURE — 36415 COLL VENOUS BLD VENIPUNCTURE: CPT | Performed by: PHYSICIAN ASSISTANT

## 2017-11-22 PROCEDURE — 72125 CT NECK SPINE W/O DYE: CPT

## 2017-11-22 PROCEDURE — 90471 IMMUNIZATION ADMIN: CPT

## 2017-11-22 PROCEDURE — 85025 COMPLETE CBC W/AUTO DIFF WBC: CPT | Performed by: PHYSICIAN ASSISTANT

## 2017-11-22 PROCEDURE — 70450 CT HEAD/BRAIN W/O DYE: CPT

## 2017-11-22 PROCEDURE — 74011250637 HC RX REV CODE- 250/637: Performed by: HOSPITALIST

## 2017-11-22 PROCEDURE — 85610 PROTHROMBIN TIME: CPT | Performed by: PHYSICIAN ASSISTANT

## 2017-11-22 PROCEDURE — 74011250636 HC RX REV CODE- 250/636: Performed by: PHYSICIAN ASSISTANT

## 2017-11-22 PROCEDURE — 99285 EMERGENCY DEPT VISIT HI MDM: CPT

## 2017-11-22 PROCEDURE — 65660000000 HC RM CCU STEPDOWN

## 2017-11-22 PROCEDURE — 96374 THER/PROPH/DIAG INJ IV PUSH: CPT

## 2017-11-22 PROCEDURE — 74011636637 HC RX REV CODE- 636/637: Performed by: HOSPITALIST

## 2017-11-22 PROCEDURE — 74011000250 HC RX REV CODE- 250: Performed by: PHYSICIAN ASSISTANT

## 2017-11-22 PROCEDURE — 94762 N-INVAS EAR/PLS OXIMTRY CONT: CPT

## 2017-11-22 PROCEDURE — 82962 GLUCOSE BLOOD TEST: CPT

## 2017-11-22 PROCEDURE — 74011250636 HC RX REV CODE- 250/636: Performed by: HOSPITALIST

## 2017-11-22 RX ORDER — ASPIRIN 81 MG/1
81 TABLET ORAL DAILY
COMMUNITY
End: 2017-11-23

## 2017-11-22 RX ORDER — ACETAMINOPHEN 650 MG/1
650 SUPPOSITORY RECTAL
Status: DISCONTINUED | OUTPATIENT
Start: 2017-11-22 | End: 2017-11-23 | Stop reason: HOSPADM

## 2017-11-22 RX ORDER — INSULIN GLARGINE 100 [IU]/ML
40 INJECTION, SOLUTION SUBCUTANEOUS
Status: DISCONTINUED | OUTPATIENT
Start: 2017-11-22 | End: 2017-11-23 | Stop reason: HOSPADM

## 2017-11-22 RX ORDER — SERTRALINE HYDROCHLORIDE 50 MG/1
25 TABLET, FILM COATED ORAL DAILY
Status: DISCONTINUED | OUTPATIENT
Start: 2017-11-23 | End: 2017-11-23 | Stop reason: HOSPADM

## 2017-11-22 RX ORDER — FELODIPINE 5 MG/1
10 TABLET, EXTENDED RELEASE ORAL DAILY
Status: DISCONTINUED | OUTPATIENT
Start: 2017-11-22 | End: 2017-11-23 | Stop reason: HOSPADM

## 2017-11-22 RX ORDER — SODIUM CHLORIDE 0.9 % (FLUSH) 0.9 %
5-10 SYRINGE (ML) INJECTION AS NEEDED
Status: DISCONTINUED | OUTPATIENT
Start: 2017-11-22 | End: 2017-11-23 | Stop reason: HOSPADM

## 2017-11-22 RX ORDER — HYDRALAZINE HYDROCHLORIDE 20 MG/ML
10 INJECTION INTRAMUSCULAR; INTRAVENOUS
Status: DISCONTINUED | OUTPATIENT
Start: 2017-11-22 | End: 2017-11-23 | Stop reason: HOSPADM

## 2017-11-22 RX ORDER — FAMOTIDINE 20 MG/1
20 TABLET, FILM COATED ORAL DAILY
Status: DISCONTINUED | OUTPATIENT
Start: 2017-11-23 | End: 2017-11-23 | Stop reason: HOSPADM

## 2017-11-22 RX ORDER — INSULIN LISPRO 100 [IU]/ML
INJECTION, SOLUTION INTRAVENOUS; SUBCUTANEOUS
Status: DISCONTINUED | OUTPATIENT
Start: 2017-11-22 | End: 2017-11-23 | Stop reason: HOSPADM

## 2017-11-22 RX ORDER — MAGNESIUM SULFATE 100 %
4 CRYSTALS MISCELLANEOUS AS NEEDED
Status: DISCONTINUED | OUTPATIENT
Start: 2017-11-22 | End: 2017-11-23 | Stop reason: HOSPADM

## 2017-11-22 RX ORDER — SPIRONOLACTONE 25 MG/1
25 TABLET ORAL DAILY
Status: DISCONTINUED | OUTPATIENT
Start: 2017-11-23 | End: 2017-11-23 | Stop reason: HOSPADM

## 2017-11-22 RX ORDER — SODIUM CHLORIDE 0.9 % (FLUSH) 0.9 %
5-10 SYRINGE (ML) INJECTION EVERY 8 HOURS
Status: DISCONTINUED | OUTPATIENT
Start: 2017-11-22 | End: 2017-11-23 | Stop reason: HOSPADM

## 2017-11-22 RX ORDER — HYDRALAZINE HYDROCHLORIDE 20 MG/ML
20 INJECTION INTRAMUSCULAR; INTRAVENOUS ONCE
Status: COMPLETED | OUTPATIENT
Start: 2017-11-22 | End: 2017-11-22

## 2017-11-22 RX ORDER — MELATONIN
1000 DAILY
Status: DISCONTINUED | OUTPATIENT
Start: 2017-11-23 | End: 2017-11-23 | Stop reason: HOSPADM

## 2017-11-22 RX ORDER — GLIPIZIDE 2.5 MG/1
2.5 TABLET, EXTENDED RELEASE ORAL
Status: DISCONTINUED | OUTPATIENT
Start: 2017-11-23 | End: 2017-11-23 | Stop reason: HOSPADM

## 2017-11-22 RX ORDER — LABETALOL HYDROCHLORIDE 5 MG/ML
10 INJECTION, SOLUTION INTRAVENOUS
Status: DISCONTINUED | OUTPATIENT
Start: 2017-11-22 | End: 2017-11-23 | Stop reason: HOSPADM

## 2017-11-22 RX ORDER — DEXTROSE 50 % IN WATER (D50W) INTRAVENOUS SYRINGE
25-50 AS NEEDED
Status: DISCONTINUED | OUTPATIENT
Start: 2017-11-22 | End: 2017-11-23 | Stop reason: HOSPADM

## 2017-11-22 RX ORDER — SIMVASTATIN 40 MG/1
40 TABLET, FILM COATED ORAL
Status: DISCONTINUED | OUTPATIENT
Start: 2017-11-22 | End: 2017-11-23 | Stop reason: HOSPADM

## 2017-11-22 RX ORDER — LABETALOL HYDROCHLORIDE 5 MG/ML
20 INJECTION, SOLUTION INTRAVENOUS
Status: COMPLETED | OUTPATIENT
Start: 2017-11-22 | End: 2017-11-22

## 2017-11-22 RX ORDER — GABAPENTIN 100 MG/1
100 CAPSULE ORAL
COMMUNITY
End: 2018-10-25 | Stop reason: SDUPTHER

## 2017-11-22 RX ORDER — ACETAMINOPHEN 325 MG/1
650 TABLET ORAL
Status: DISCONTINUED | OUTPATIENT
Start: 2017-11-22 | End: 2017-11-23 | Stop reason: HOSPADM

## 2017-11-22 RX ORDER — CARVEDILOL 12.5 MG/1
25 TABLET ORAL 2 TIMES DAILY WITH MEALS
Status: DISCONTINUED | OUTPATIENT
Start: 2017-11-22 | End: 2017-11-23 | Stop reason: HOSPADM

## 2017-11-22 RX ORDER — LIDOCAINE HYDROCHLORIDE 20 MG/ML
10 INJECTION, SOLUTION EPIDURAL; INFILTRATION; INTRACAUDAL; PERINEURAL
Status: DISCONTINUED | OUTPATIENT
Start: 2017-11-22 | End: 2017-11-22 | Stop reason: HOSPADM

## 2017-11-22 RX ORDER — ONDANSETRON 2 MG/ML
4 INJECTION INTRAMUSCULAR; INTRAVENOUS
Status: DISCONTINUED | OUTPATIENT
Start: 2017-11-22 | End: 2017-11-23 | Stop reason: HOSPADM

## 2017-11-22 RX ADMIN — HYDRALAZINE HYDROCHLORIDE 10 MG: 20 INJECTION INTRAMUSCULAR; INTRAVENOUS at 17:59

## 2017-11-22 RX ADMIN — INSULIN GLARGINE 40 UNITS: 100 INJECTION, SOLUTION SUBCUTANEOUS at 22:43

## 2017-11-22 RX ADMIN — SIMVASTATIN 40 MG: 40 TABLET, FILM COATED ORAL at 22:43

## 2017-11-22 RX ADMIN — TETANUS TOXOID, REDUCED DIPHTHERIA TOXOID AND ACELLULAR PERTUSSIS VACCINE, ADSORBED 0.5 ML: 5; 2.5; 8; 8; 2.5 SUSPENSION INTRAMUSCULAR at 12:51

## 2017-11-22 RX ADMIN — HYDRALAZINE HYDROCHLORIDE 20 MG: 20 INJECTION INTRAMUSCULAR; INTRAVENOUS at 16:23

## 2017-11-22 RX ADMIN — FELODIPINE 10 MG: 5 TABLET, FILM COATED, EXTENDED RELEASE ORAL at 20:20

## 2017-11-22 RX ADMIN — Medication 10 ML: at 22:43

## 2017-11-22 RX ADMIN — LABETALOL HYDROCHLORIDE 20 MG: 5 INJECTION INTRAVENOUS at 12:54

## 2017-11-22 RX ADMIN — CARVEDILOL 25 MG: 12.5 TABLET, FILM COATED ORAL at 20:20

## 2017-11-22 RX ADMIN — HYDRALAZINE HYDROCHLORIDE 10 MG: 20 INJECTION INTRAMUSCULAR; INTRAVENOUS at 18:53

## 2017-11-22 NOTE — ROUTINE PROCESS
TRANSFER - OUT REPORT:    Verbal report given to [facility] ([place] [place]) RN(name) on Lee Rebolledo  being transferred to [facility](unit) for routine progression of care       Report consisted of patients Situation, Background, Assessment and   Recommendations(SBAR). Information from the following report(s) SBAR, Kardex, ED Summary and MAR was reviewed with the receiving nurse. Lines:   Peripheral IV 11/22/17 Right Antecubital (Active)   Site Assessment Clean, dry, & intact 11/22/2017  5:35 PM   Phlebitis Assessment 0 11/22/2017  5:35 PM   Infiltration Assessment 0 11/22/2017  5:35 PM   Dressing Status Clean, dry, & intact 11/22/2017  5:35 PM        Opportunity for questions and clarification was provided.       Patient transported with:   [employer]

## 2017-11-22 NOTE — CONSULTS
Full note dictated. Briefly, 79yo s/p fall in bathtub, unsure if LOC. Head CT shows small amount of convexity sah without mass effect. Recommend repeat CT in am.  OK to eat. Unlikely to require any neurosurgical intervention. Thank you for this consultation.

## 2017-11-22 NOTE — PROGRESS NOTES
Renal Dosing/Monitoring  Medication: Pepcid   Current regimen:  20mg every 12 hr  Recent Labs      11/22/17   1207   CREA  0.82   BUN  19     Estimated CrCl:  45.5 ml/min  Plan: Change to 20mg Q24h for CrCl <50ml/min.

## 2017-11-22 NOTE — ED NOTES
CONSULT NOTE:  3:41 PM Ratna Montenegro MD spoke with Dr. Tami Romano, Consult for Hospitalist.  Discussed available diagnostic tests and clinical findings. He is in agreement with care plans as outlined. Dr. Tami Romano will admit.

## 2017-11-22 NOTE — ED TRIAGE NOTES
Pt states she was sitting on a bench beside her bathtub when she lost her balance and fell backwards, striking the back of her head. Pt is unsure of what she struck her head but states she did not pass out and remembers the entire event. States she was not dizzy at the time either. Pt has a laceration on back of head, bleeding controlled PTA and bandaged by EMS. Pt has other bandages on her arms from other events.

## 2017-11-22 NOTE — ED NOTES
TRANSFER - OUT REPORT:    Verbal report given to Ifrah Majano(name) on Sebastian Barton  being transferred to [facility] ED(unit) for urgent transfer       Report consisted of patients Situation, Background, Assessment and   Recommendations(SBAR). Information from the following report(s) SBAR, Kardex, ED Summary, [facility] and Recent Results was reviewed with the receiving nurse. Lines:   Peripheral IV 11/22/17 Right Antecubital (Active)   Site Assessment Clean, dry, & intact 11/22/2017 12:05 PM   Phlebitis Assessment 0 11/22/2017 12:05 PM   Infiltration Assessment 0 11/22/2017 12:05 PM   Dressing Status Clean, dry, & intact 11/22/2017 12:05 PM   Dressing Type Tape;Transparent 11/22/2017 12:05 PM   Hub Color/Line Status Pink;Capped;Flushed;Patent 11/22/2017 12:05 PM   Action Taken Blood drawn 11/22/2017 12:05 PM        Opportunity for questions and clarification was provided.       Patient transported with:   Monitor, EMT, Paramedic

## 2017-11-22 NOTE — ED NOTES
Pt to be transported to [facility] ED by ALS providers with [facility]. Pt alert and oriented x4 with family at bedside. Pt's family to meet the pt at [facility]. Pt denies any complaints at this time.

## 2017-11-22 NOTE — ED TRIAGE NOTES
Triage:  Pt transfer from [facility] due to GLF this AM with associated closed head injury. Pt states was exiting her bathtub when she lost balanced causing her to fall hitting the anterior aspect of her head on her vanity. Pt was unable to get up assisted by family EMS called and transferred to [facility] for evaluation and treatment. [facility] found a hemorrhage in parenchyma of brain. Pt denies any LOC with fall but states was dazed. Pt A/O x 4 on arrival, noted movements in all extremities.

## 2017-11-22 NOTE — ED NOTES
PROGRESS NOTE:  2:51 PM  Pt accepted in transfer from [address] [place] [address], please see prior note. Pt reportedly fell in the bathroom today and hit her head on the vanity. Pt reports she feels a little better, but does have some back pain. I've reviewed pt's CT of head and neck, labs, and discussed with Dr. Elda Hodges, neurosurgery. Dr. Elda Hodges would like the pt admitted to hospitalist and she will see in consult. Pt's physical remarkable for alert and oriented X3, cranial nerves intact, moving all extremities, normal rate/rhythm, normal abdomen, normal breath sounds  Note written by Agata Barroso, as dictated by Barbara Siemens, MD 2:53 PM      THOMAS  Impression: 59-year-old female accepted in transfer from [address] [place] [address] for an acute fall today with subsequent subarachnoid hemorrhage and subdural hematoma patient is neurologically intact on my examination. I discussed the case with neurosurgery who will consult, patient be admitted to the hospital service.

## 2017-11-22 NOTE — CONSULTS
[address]   [address] [facility], [address] [place] [address]   [address] [place] [address]       Name:  Joo Simmons   MR#:  [id number]   :  1933   Account #:  [de-identified]    Date of Consultation:  2017   Date of Adm:  2017       REASON FOR CONSULTATION: Subarachnoid hemorrhage,   traumatic. HISTORY OF PRESENT ILLNESS: This is an 69-year-old woman who   had a fall in the bathtub earlier this morning. She is unsure whether   she lost any consciousness. She states that she slipped and fell   backwards, hitting her head against the bathroom vanity. She also had   a fall several weeks ago. She currently is staying with her daughter   who lives here in [place] because the patient had a doctor's appointment in [place],   but she lives in [place] in a [facility] and says that was   where she had her last fall. With her last fall several weeks ago, she did not have loss of consciousness  but she did fall onto her right side and is still   complaining of some tenderness along her right arm. PAST MEDICAL HISTORY: Coronary artery disease, diabetes,   hypertension, osteoarthritis, hyperlipidemia. PAST SURGICAL HISTORY: CABG, stent placement, appendectomy,   cholecystectomy. MEDICATIONS PRIOR TO ADMISSION   1. Zocor. 2. Zoloft. 3. Aldactone. 4. Glucophage. 5. Ultram.   6. Neurontin. 7. Insulin. 8. Plendil. 8. Glucotrol. 10. Coreg. 11. Hyzaar. 12. Vitamin D3.    FAMILY HISTORY: Positive for coronary artery disease, diabetes. SOCIAL HISTORY: She usually lives on her own. She does have   family involved in her care. No significant alcohol history. Denies any   tobacco history. REVIEW OF SYSTEMS: Currently denying any vision changes or   hearing difficulty. Complaining of some headache in the back of her   head, some neck pain. No shortness of breath, abdominal pain, urinary   dysfunction, numbness, muscle spasm or skin eruption.     PHYSICAL EXAMINATION VITAL SIGNS: Temperature 98.1, blood pressure 175/55, pulse 68. GENERAL: This is a well-developed, well-nourished woman in no   apparent distress. She is examined lying on a hospital gurney. NEUROLOGIC: She is alert. She is oriented x3. Normal affect. Fluent   speech. Conjugate gaze. Symmetric face. She has an abrasion on the   posterior aspect of her head with some evidence   of hemorrhage. No pronator drift. Full strength in bilateral upper   extremities. She has full strength in bilateral lower extremities. She has   complained of some back pain in the lumbar region during   examination, she says the lumbar pain is related to the fall that she   had several weeks ago. IMAGING STUDIES: She has a head CT that shows a small amount of   diffuse convexity subarachnoid hemorrhage on the right hemisphere,   no mass effect. ASSESSMENT AND PLAN: This is an 15-year-old woman who had a   fall and has a traumatic subarachnoid hemorrhage. At this point, I   would recommend observation overnight, repeat head CT in the   morning. I think it is unlikely that she will require any surgical   intervention. It is alright for her to have her diet advanced as tolerated. We will follow with you. Thank you for this consultation.         MD ULI Burk / ROSCOE   D:  11/22/2017   17:24   T:  11/22/2017   18:32   Job #:  [id number]

## 2017-11-22 NOTE — IP AVS SNAPSHOT
[address] [facility] [address] [place] [address] 
[phone] Patient: Elicia Rodas MRN: [id number] GMF:[id number] About your hospitalization You were admitted on:  November 22, 2017 You last received care in the:  [address] [place] [address] NEURO-SCI TELE You were discharged on:  November 23, 2017 Why you were hospitalized Your primary diagnosis was:  Sah (Subarachnoid Hemorrhage) (Hcc) Your diagnoses also included:  Fall, Hypomagnesemia Things You Need To Do (next 8 weeks) Follow up with Gino Lozano MD in 1 week(s)  
hospital follow up Phone:  [phone] Where:  [address] [facility], [address] [facility] [address], [employer] [address] Follow up with Miriam Liu MD in 3 week(s) Neurosurgery; hospital follow up Phone:  [phone] Where:  [address], [facility] [address] [address] Discharge Orders None A check maurice indicates which time of day the medication should be taken. My Medications STOP taking these medications   
 aspirin 81 mg tablet  
   
  
 aspirin delayed-release 81 mg tablet  
   
  
 losartan-hydroCHLOROthiazide 100-25 mg per tablet Commonly known as:  HYZAAR  
   
  
 metFORMIN 1,000 mg tablet Commonly known as:  GLUCOPHAGE  
   
  
  
TAKE these medications as instructed Instructions Each Dose to Equal  
 Morning Noon Evening Bedtime  
 carvedilol 25 mg tablet Commonly known as:  COREG  
   
 TAKE 1 TABLET TWICE DAILY WITH MEALS  
     
  
   
   
  
   
  
 felodipine 10 mg 24 hr tablet Commonly known as:  PLENDIL SR  
   
 TAKE 1 TABLET EVERY DAY  
     
   
   
   
  
 gabapentin 100 mg capsule Commonly known as:  NEURONTIN Take 100 mg by mouth nightly. 100 mg  
    
   
   
   
  
 glipiZIDE SR 2.5 mg CR tablet Commonly known as:  GLUCOTROL XL Start taking on:  11/24/2017 Take 2 Tabs by mouth Daily (before breakfast). Indications: type 2 diabetes mellitus 5 mg  
    
  
   
   
   
  
 insulin detemir 100 unit/mL (3 mL) Inpn Commonly known as:  LEVEMIR FLEXTOUCH  
   
 45 Units by SubCUTAneous route nightly. 45 Units  
    
   
   
   
  
 losartan 100 mg tablet Commonly known as:  COZAAR Take 1 Tab by mouth daily. Indications: hypertension 100 mg  
    
  
   
   
   
  
 sertraline 25 mg tablet Commonly known as:  ZOLOFT  
   
 TAKE 1 TABLET EVERY DAY  
     
   
   
   
  
 simvastatin 40 mg tablet Commonly known as:  ZOCOR  
   
 TAKE 1 TABLET EVERY NIGHT  
     
   
   
   
  
 spironolactone 25 mg tablet Commonly known as:  ALDACTONE  
   
 TAKE 1 TABLET EVERY DAY  
     
   
   
   
  
 VITAMIN D3 1,000 unit tablet Generic drug:  cholecalciferol Take 1,000 Units by mouth daily. 1000 Units Where to Get Your Medications Information on where to get these meds will be given to you by the nurse or doctor. ! Ask your nurse or doctor about these medications  
  glipiZIDE SR 2.5 mg CR tablet  
 losartan 100 mg tablet Discharge Instructions ADDITIONAL CARE RECOMMENDATIONS:  
1. Take medications as prescribed. 2. Keep appointment(s) as recommended/scheduled. 3. Due to age and risk of falls, HCTZ (hydrochlorothiazide, a diuretic) and metformin have been stopped. Please discuss these medication changes with your PCP. 4. Fall precautions. 5. Please have PCP monitor the cut on the back of the head. DIET: Cardiac Diet and Diabetic Diet ACTIVITY: Activity as tolerated with fall precautions WOUND CARE: none EQUIPMENT needed: none Preventing Falls: Care Instructions Your Care Instructions Getting around your home safely can be a challenge if you have injuries or health problems that make it easy for you to fall.  Loose rugs and furniture in walkways are among the dangers for many older people who have problems walking or who have poor eyesight. People who have conditions such as arthritis, osteoporosis, or dementia also have to be careful not to fall. You can make your home safer with a few simple measures. Follow-up care is a key part of your treatment and safety. Be sure to make and go to all appointments, and call your doctor if you are having problems. It's also a good idea to know your test results and keep a list of the medicines you take. How can you care for yourself at home? Taking care of yourself · You may get dizzy if you do not drink enough water. To prevent dehydration, drink plenty of fluids, enough so that your urine is light yellow or clear like water. Choose water and other caffeine-free clear liquids. If you have kidney, heart, or liver disease and have to limit fluids, talk with your doctor before you increase the amount of fluids you drink. · Exercise regularly to improve your strength, muscle tone, and balance. Walk if you can. Swimming may be a good choice if you cannot walk easily. · Have your vision and hearing checked each year or any time you notice a change. If you have trouble seeing and hearing, you might not be able to avoid objects and could lose your balance. · Know the side effects of the medicines you take. Ask your doctor or pharmacist whether the medicines you take can affect your balance. Sleeping pills or sedatives can affect your balance. · Limit the amount of alcohol you drink. Alcohol can impair your balance and other senses. · Ask your doctor whether calluses or corns on your feet need to be removed. If you wear loose-fitting shoes because of calluses or corns, you can lose your balance and fall. · Talk to your doctor if you have numbness in your feet. Preventing falls at home · Remove raised doorway thresholds, throw rugs, and clutter.  Repair loose carpet or raised areas in the floor. · Move furniture and electrical cords to keep them out of walking paths. · Use nonskid floor wax, and wipe up spills right away, especially on ceramic tile floors. · If you use a walker or cane, put rubber tips on it. If you use crutches, clean the bottoms of them regularly with an abrasive pad, such as steel wool. · Keep your house well lit, especially Mammie Sitter, and outside walkways. Use night-lights in areas such as hallways and bathrooms. Add extra light switches or use remote switches (such as switches that go on or off when you clap your hands) to make it easier to turn lights on if you have to get up during the night. · Install sturdy handrails on stairways. · Move items in your cabinets so that the things you use a lot are on the lower shelves (about waist level). · Keep a cordless phone and a flashlight with new batteries by your bed. If possible, put a phone in each of the main rooms of your house, or carry a cell phone in case you fall and cannot reach a phone. Or, you can wear a device around your neck or wrist. You push a button that sends a signal for help. · Wear low-heeled shoes that fit well and give your feet good support. Use footwear with nonskid soles. Check the heels and soles of your shoes for wear. Repair or replace worn heels or soles. · Do not wear socks without shoes on wood floors. · Walk on the grass when the sidewalks are slippery. If you live in an area that gets snow and ice in the winter, sprinkle salt on slippery steps and sidewalks. Preventing falls in the bath · Install grab bars and nonskid mats inside and outside your shower or tub and near the toilet and sinks. · Use shower chairs and bath benches. · Use a hand-held shower head that will allow you to sit while showering.  
· Get into a tub or shower by putting the weaker leg in first. Get out of a tub or shower with your strong side first. 
 · Repair loose toilet seats and consider installing a raised toilet seat to make getting on and off the toilet easier. · Keep your bathroom door unlocked while you are in the shower. Where can you learn more? Go to http://hemalatha-ebony.info/. Enter 0476 [id number] in the search box to learn more about \"Preventing Falls: Care Instructions. \" Current as of: May 12, 2017 Content Version: 11.4 © 2964-8270 [employer]. Care instructions adapted under license by [employer] (which disclaims liability or warranty for this information). If you have questions about a medical condition or this instruction, always ask your healthcare professional. [place] [address] any warranty or liability for your use of this information. Learning About Subarachnoid Hemorrhage What is a subarachnoid hemorrhage? A hemorrhage is the bursting of a blood vessel. The space between the brain and the tissue that covers the brain is called the subarachnoid space. So this kind of hemorrhage is bleeding in that space just outside the brain. When blood spills into this small space, it builds up and presses on the brain. This often causes sudden and severe head pain. Other symptoms sometimes include nausea and vomiting, neck pain, or vision problems. Some people pass out or have a seizure. But the most common symptom is what many people describe as \"the worst headache of my life. \" This hemorrhage is a type of stroke. Quick treatment is needed to prevent brain damage and death. People who have brain damage from a subarachnoid hemorrhage may have a hard time talking, understanding things, and making decisions. They may have to relearn daily activities, such as how to eat, bathe, and dress. How well someone recovers depends on how quickly the person gets to the hospital and how severe the hemorrhage was. A stroke rehab program may help. What causes it? Most subarachnoid hemorrhages are caused when a brain aneurysm bursts. An aneurysm (say \"EGK-qef-feq-um\") is a bulging, weak area in the wall of an artery that supplies blood to the brain. It can be hard to know what exactly caused a brain aneurysm and why it burst. Many things can raise the risk of this, such as smoking, high blood pressure, and a family history of aneurysms. This type of hemorrhage can also be caused by a head injury. How is it treated? The goal of treatment is to prevent brain damage, more bleeding, and other serious problems. You will likely be in the hospital's intensive care unit, where your medical team can keep a close watch on you. They will work to control your blood pressure, manage pain, and watch for symptoms of brain damage. You may have more tests to find out for sure that an aneurysm caused the bleeding. If you have an aneurysm, you may have a surgery to fix it. This can help prevent another bleeding episode. Two types of surgery can be used: · Clipping. The doctor makes cuts (incisions) in your scalp and through the bone of your skull. Then the doctor places a tiny metal clip over the weak part of the blood vessel. This stops the flow of blood. · Coiling. The doctor makes a cut in your groin. Then the doctor moves a small plastic tube through the cut and into a blood vessel. The tube is called a catheter. Using X-rays, the doctor gently guides the catheter through the blood vessel up to the brain aneurysm. Then the doctor uses a tool to fill up the aneurysm with tiny wire coils or block the opening. This keeps blood from getting back into the aneurysm. You will need treatment even if your symptoms go away. This is because there is a good chance that the area will bleed again. Follow-up care is a key part of your treatment and safety.  Be sure to make and go to all appointments, and call your doctor if you are having problems. It's also a good idea to know your test results and keep a list of the medicines you take. Where can you learn more? Go to http://hemalatha-ebony.info/. Enter V883 in the search box to learn more about \"Learning About Subarachnoid Hemorrhage. \" Current as of: March 20, 2017 Content Version: 11.4 © 9516-9973 [employer]. Care instructions adapted under license by [employer] (which disclaims liability or warranty for this information). If you have questions about a medical condition or this instruction, always ask your healthcare professional. [place] [address] any warranty or liability for your use of this information. ACO Transitions of Care Introducing [employer] [address] offers a voluntary care coordination program to provide high quality service and care to [facility] fee-for-service beneficiaries. [place] was designed to help you enhance your health and well-being through the following services: ? Transitions of Care  support for individuals who are transitioning from one care setting to another (example: Hospital to home). ? Chronic and Complex Care Coordination  support for individuals and caregivers of those with serious or chronic illnesses or with more than one chronic (ongoing) condition and those who take a number of different medications. If you meet specific medical criteria, a [facility] may call you directly to coordinate your care with your primary care physician and your other care providers. For questions about the [facility] programs, please, contact your physicians office. For general questions or additional information about Accountable Care Organizations: 
Please visit www.medicare.gov/acos. html or call 1-800-MEDICARE (3-570.195.1596) TTY users should call 4-710.982.6765. [employer] Announcement We are excited to announce that we are making your provider's discharge notes available to you in [employer]. You will see these notes when they are completed and signed by the physician that discharged you from your recent hospital stay. If you have any questions or concerns about any information you see in [employer], please call the Health Information Department where you were seen or reach out to your Primary Care Provider for more information about your plan of care. Introducing [facility]! [facility] introduces [employer] patient portal. Now you can access parts of your medical record, email your doctor's office, and request medication refills online. 1. In your internet browser, go to [email or web address] 2. Click on the First Time User? Click Here link in the Sign In box. You will see the New Member Sign Up page. 3. Enter your [employer] Access Code exactly as it appears below. You will not need to use this code after youve completed the sign-up process. If you do not sign up before the expiration date, you must request a new code. · [employer] Access Code: [email or web address] Expires: 2/21/2018  4:29 PM 
 
4. Enter the last four digits of your Social Security Number (xxxx) and Date of Birth (mm/dd/yyyy) as indicated and click Submit. You will be taken to the next sign-up page. 5. Create a [employer] ID. This will be your [employer] login ID and cannot be changed, so think of one that is secure and easy to remember. 6. Create a [employer] password. You can change your password at any time. 7. Enter your Password Reset Question and Answer. This can be used at a later time if you forget your password. 8. Enter your e-mail address. You will receive e-mail notification when new information is available in [address] 9. Click Sign Up. You can now view and download portions of your medical record. 10. Click the Download Summary menu link to download a portable copy of your medical information. If you have questions, please visit the Frequently Asked Questions section of the [employer] website. Remember, AddressReport is NOT to be used for urgent needs. For medical emergencies, dial 911. Now available from your iPhone and Android! Providers Seen During Your Hospitalization Provider Specialty Primary office phone Jus Hawley MD Emergency Medicine [phone] Ana Maria Loo MD Internal Medicine [phone] Maritza Martines MD Internal Medicine [phone] Immunizations Administered for This Admission Name Date Influenza Vaccine (Quad) PF 11/23/2017 Tdap 11/22/2017 Your Primary Care Physician (PCP) Primary Care Physician Office Phone Office Fax Una Gurrola [phone] You are allergic to the following No active allergies Recent Documentation Height Weight Breastfeeding? BMI OB Status Smoking Status 1.549 m 68.4 kg No 28.49 kg/m2 Hysterectomy Never Smoker Emergency Contacts Name Discharge Info Relation Home Work Tia Garcia DISCHARGE CAREGIVER [3] Child [2] [phone] Patient Belongings The following personal items are in your possession at time of discharge: 
  Dental Appliances: Uppers, Partials, At home (Upper denture, lower partial at home)  Visual Aid: Glasses, At bedside      Home Medications: None   Jewelry: None  Clothing: Pajamas (Pajama pants with pt. Pajama shirt sent home)    Other Valuables: None Please provide this summary of care documentation to your next provider. Signatures-by signing, you are acknowledging that this After Visit Summary has been reviewed with you and you have received a copy. Patient Signature:  ____________________________________________________________ Date:  ____________________________________________________________  
  
Shahid Shove Provider Signature:  ____________________________________________________________ Date:  ____________________________________________________________

## 2017-11-22 NOTE — H&P
History & Physical    Date of admission: 11/22/2017    Patient name: Sofie Quiñones  MRN: [id number]  YOB: 1933  Age: 80 y.o. Primary care provider:  Sasha Sales MD     Source of Information: patient, medical records     Chief complain: fall    History of present illness  Sofie Quiñones is a 80 y.o. female who presents with PMHx of CAD, DM, HTN, REYMUNDO admitted s/p Fall. Pt states that she was getting out of her bathtub when slipped and fell backward hitting the vanity in the bathroom. Pt lives with her daughter who heard the thump and went to up to room found patient on floor with blood coming from back of the head. So patient was brought to ER at [facility] where she was found to have b/l SAH. Case discussed with  who recommended patient be transferred to [facility] for observation. Pt currently c/o pain in back of head, no HA, dizziness, lightheadedness, n/v, weakness, slurred speech, LOC, chest pain or SOB. Past Medical History:   Diagnosis Date    CAD (coronary artery disease)     CAD (coronary artery disease), native coronary artery 6/18/2010    Diabetes ([facility] [address] 12/20/2010    Hypercholesterolemia 6/18/2010    Hypertension 6/18/2010    Mobility impaired 12/12/2013    Use cane and as needed wheelchair and walker      REYMUNDO (obstructive sleep apnea) 6/18/2010      Past Surgical History:   Procedure Laterality Date    CARDIAC SURG PROCEDURE UNLIST  1989    cabg    CARDIAC SURG PROCEDURE UNLIST      stent    HX APPENDECTOMY      HX CHOLECYSTECTOMY      HX GYN       Prior to Admission medications    Medication Sig Start Date End Date Taking?  Authorizing Provider   simvastatin (ZOCOR) 40 mg tablet TAKE 1 TABLET EVERY NIGHT 11/7/17   Sasha Sales MD   sertraline (ZOLOFT) 25 mg tablet TAKE 1 TABLET EVERY DAY 11/7/17   Sasha Sales MD   spironolactone (ALDACTONE) 25 mg tablet TAKE 1 TABLET EVERY DAY 11/1/17   Talia Caceres MD   metFORMIN (GLUCOPHAGE) 1,000 mg tablet TAKE 1 TABLET TWICE DAILY ( WITH MEALS ) AS DIRECTED 11/1/17   Talia Caceres MD   traMADol [facility] Or) 50 mg tablet  8/26/17   Historical Provider   gabapentin (NEURONTIN) 100 mg capsule TAKE 1 TO 2 CAPSULES AT BEDTIME 8/18/17   Talia Caceres MD   insulin detemir (LEVEMIR FLEXTOUCH) 100 unit/mL (3 mL) inpn 45 Units by SubCUTAneous route nightly. 8/18/17   Talia Caceres MD   felodipine (PLENDIL SR) 10 mg 24 hr tablet TAKE 1 TABLET EVERY DAY 7/31/17   Talia Caceres MD   glipiZIDE SR (GLUCOTROL XL) 2.5 mg CR tablet TAKE 1 TABLET EVERY DAY (NEW DOSE) 7/17/17   Talia Caceres MD   carvedilol (COREG) 25 mg tablet TAKE 1 TABLET TWICE DAILY WITH MEALS 5/17/17   Talia Caceres MD   losartan-hydroCHLOROthiazide [facility]) 100-25 mg per tablet TAKE 1 TABLET EVERY DAY 5/9/17   Talia Caceres MD   PEN NEEDLE 31 X 5/16 \" ndle USE DAILY 8/6/14   Talia Caceres MD   Insulin Needles, Disposable, (BD INSULIN PEN NEEDLE UF SHORT) 31 X 5/16 \" Ndle Daily 6/13/13   Talia Caceres MD   cholecalciferol (VITAMIN D3) 1,000 unit tablet Take  by mouth daily. Historical Provider   aspirin 81 mg tablet Take  by mouth. Historical Provider     No Known Allergies   Family History   Problem Relation Age of Onset    Heart Disease Mother     Heart Disease Father     Diabetes Brother       Family history reviewed and non-contributory. Social history  Patient resides    Independently    X  With family care      Assisted living      SNF    Ambulates  X  Independently      With cane       Assisted walker         Alcohol history   X  None     Social     Chronic   Smoking history  X  None     Former smoker     Current smoker     History   Smoking Status    Never Smoker   Smokeless Tobacco    Never Used       Code status    Full code   X  DNR/DNI        Code status discussed with the patient/caregivers.   No Order    Review of systems  The patient denies any fever, chills, chest pain, cough, congestion, recent illness, palpitations, or dysuria. A comprehensive review of systems was negative except for that written in the History of Present Illness. The remainder of the review of systems was reviewed and is noncontributory. Physical Examination   Visit Vitals    /57    Pulse 62    Temp 98.1 °F (36.7 °C)    Resp 24    Ht 5' 1\" (1.549 m)    Wt 66.7 kg (147 lb 0.8 oz)    SpO2 91%    BMI 27.78 kg/m2          O2 Device: Room air    General:  Alert, cooperative, no distress   Head:  Erythema and old blood product in occipital region with mild tenderness to palpation. No active bleeding. Eyes:  Conjunctivae/corneas clear.  PERRL, EOMs intact   E/N/M/T: Teeth and gums normal  Clear oropharynx   Neck: No carotid bruit   Normal JVP   Lungs:   Symmetrical chest expansion and respiratory effort  Clear to auscultation bilaterally   Heart:  Regular rhythm   Sounds normal;   Abdomen:   Soft, no tenderness  Bowel sounds normal     Extremities: Extremities normal, atraumatic  No cyanosis or edema  No DVT signs   Pulses 2+ and symmetric all extremities   Skin: No rashes or ulcers   Musculo-      skeletal: Gait not tested  Normal symmetry, ROM, strength and tone   Neuro: AO3, PERRLA, Speech Clear, No facial asymmetry, Motor 5/5, sensation intact, no drift   Psych: Alert, oriented x3  Normal affect, judgement and insight   Geniturinary: NONE     Data Review    24 Hour Results:  Recent Results (from the past 24 hour(s))   CBC WITH AUTOMATED DIFF    Collection Time: 11/22/17 12:07 PM   Result Value Ref Range    WBC 12.3 (H) 3.6 - 11.0 K/uL    RBC 4.07 3.80 - 5.20 M/uL    HGB 13.0 11.5 - 16.0 g/dL    HCT 39.1 35.0 - 47.0 %    MCV 96.1 80.0 - 99.0 FL    MCH 31.9 26.0 - 34.0 PG    MCHC 33.2 30.0 - 36.5 g/dL    RDW 13.6 11.5 - 14.5 %    PLATELET 761 844 - 969 K/uL    NEUTROPHILS 75 32 - 75 %    LYMPHOCYTES 17 12 - 49 %    MONOCYTES 6 5 - 13 % EOSINOPHILS 2 0 - 7 %    BASOPHILS 0 0 - 1 %    ABS. NEUTROPHILS 9.2 (H) 1.8 - 8.0 K/UL    ABS. LYMPHOCYTES 2.1 0.8 - 3.5 K/UL    ABS. MONOCYTES 0.7 0.0 - 1.0 K/UL    ABS. EOSINOPHILS 0.3 0.0 - 0.4 K/UL    ABS. BASOPHILS 0.0 0.0 - 0.1 K/UL   METABOLIC PANEL, BASIC    Collection Time: 11/22/17 12:07 PM   Result Value Ref Range    Sodium 142 136 - 145 mmol/L    Potassium 4.2 3.5 - 5.1 mmol/L    Chloride 105 97 - 108 mmol/L    CO2 25 21 - 32 mmol/L    Anion gap 12 5 - 15 mmol/L    Glucose 117 (H) 65 - 100 mg/dL    BUN 19 6 - 20 MG/DL    Creatinine 0.82 0.55 - 1.02 MG/DL    BUN/Creatinine ratio 23 (H) 12 - 20      GFR est AA >60 >60 ml/min/1.73m2    GFR est non-AA >60 >60 ml/min/1.73m2    Calcium 9.6 8.5 - 10.1 MG/DL   PROTHROMBIN TIME + INR    Collection Time: 11/22/17 12:07 PM   Result Value Ref Range    INR 1.1 0.9 - 1.1      Prothrombin time 10.7 9.0 - 11.1 sec     Recent Labs      11/22/17   1207   WBC  12.3*   HGB  13.0   HCT  39.1   PLT  358     Recent Labs      11/22/17   1207   NA  142   K  4.2   CL  105   CO2  25   GLU  117*   BUN  19   CREA  0.82   CA  9.6   INR  1.1       Imaging  CT head:  IMPRESSION  IMPRESSION: Acute subarachnoid hemorrhage. Probable trace parafalcine acute subdural hemorrhage.       Assessment and Plan   Active Problems:    SAH (subarachnoid hemorrhage) ([facility] [address] (11/22/2017)      Fall (11/22/2017)      Traumatic SAH due to fall  - Admit to neuro: D/w   - Neuro checks  - keep SBP<140  - CT head in AM   - Neurosurgery to see patient  - if worsening mental status, repeat CT head STAT  - D/C Aspirin    Uncontrolled HTN  - resume Home meds  - PRN Hydralazine and Labetalol ordered    DM type 2   - on Levemir 45U HS, will change to Lantus 40HS   - Sliding scale  - hold Metformin  - C/w Glipizide        Diet: Diabetic/Cardiac  Activity: with PT/OT  DVT prophylaxis: SCDs  Isolation precautions: none  Consultations: Neurosurg  Anticipated disposition: in 1-2 days         Signed by: Sia Sahu MD    November 22, 2017 at 4:23 PM

## 2017-11-22 NOTE — ED PROVIDER NOTES
HPI Comments: 80 y.o. female with past medical history significant for CAD, REYMUNDO, dyslipidemia, and DM presents with superficial laceration to her occipital scalp after falling in bathtub earlier this morning. The pt was accompanied to the ED by her daughter who reports Mane Lott was sitting in the tub when her legs went from under her and she hit the back of her head. \"  The pt denies LOC. Her main complaint is bleeding from the back of her scalp. She is not on any blood thinners. There are no other acute medical complaints at this time. PCP: MD Khang Chahal PA-C    Patient is a 80 y.o. female presenting with skin laceration and fall. Laceration    Pertinent negatives include no numbness. Fall   Pertinent negatives include no fever, no numbness, no abdominal pain, no nausea, no vomiting and no hematuria. Past Medical History:   Diagnosis Date    CAD (coronary artery disease)     CAD (coronary artery disease), native coronary artery 6/18/2010    Diabetes ([address] 12/20/2010    Hypercholesterolemia 6/18/2010    Hypertension 6/18/2010    Mobility impaired 12/12/2013    Use cane and as needed wheelchair and walker      REYMUNDO (obstructive sleep apnea) 6/18/2010       Past Surgical History:   Procedure Laterality Date    CARDIAC SURG PROCEDURE UNLIST  1989    cabg    CARDIAC SURG PROCEDURE UNLIST      stent    HX APPENDECTOMY      HX CHOLECYSTECTOMY      HX GYN           Family History:   Problem Relation Age of Onset    Heart Disease Mother     Heart Disease Father     Diabetes Brother        Social History     Social History    Marital status: SINGLE     Spouse name: N/A    Number of children: N/A    Years of education: N/A     Occupational History    Not on file.      Social History Main Topics    Smoking status: Never Smoker    Smokeless tobacco: Never Used    Alcohol use No    Drug use: No    Sexual activity: Not Currently     Other Topics Concern    Not on file Social History Narrative         ALLERGIES: Review of patient's allergies indicates no known allergies. Review of Systems   Constitutional: Negative for activity change, appetite change, diaphoresis and fever. HENT: Negative for ear discharge, ear pain, facial swelling, rhinorrhea, sore throat, tinnitus, trouble swallowing and voice change. Eyes: Negative for photophobia, pain, discharge, redness and visual disturbance. Respiratory: Negative for cough, chest tightness, shortness of breath, wheezing and stridor. Cardiovascular: Negative for chest pain and palpitations. Gastrointestinal: Negative for abdominal pain, constipation, diarrhea, nausea and vomiting. Endocrine: Negative for polydipsia and polyuria. Genitourinary: Negative for dysuria, flank pain and hematuria. Musculoskeletal: Negative for arthralgias, back pain and myalgias. Skin: Positive for wound. Negative for color change and rash. Neurological: Negative for dizziness, syncope, speech difficulty, light-headedness and numbness. Psychiatric/Behavioral: Negative for behavioral problems. Vitals:    11/22/17 1034 11/22/17 1254 11/22/17 1300   BP: 180/62 169/60 155/70   Pulse: 60 64 (!) 59   Resp: 12  16   Temp: 97.8 °F (36.6 °C)     SpO2: 94%     Weight: 66.7 kg (147 lb)     Height: 5' 1\" (1.549 m)              Physical Exam   Constitutional: She is oriented to person, place, and time. She appears well-developed and well-nourished. HENT:   Head: Normocephalic. No hemotympanum. Eyes: Conjunctivae are normal. Pupils are equal, round, and reactive to light. Right eye exhibits no discharge. Left eye exhibits no discharge. Neck: Normal range of motion. Neck supple. No thyromegaly present. Cardiovascular: Normal rate, regular rhythm and normal heart sounds. Exam reveals no gallop and no friction rub. No murmur heard. Pulmonary/Chest: Effort normal and breath sounds normal. No respiratory distress.  She has no wheezes. Abdominal: Soft. Bowel sounds are normal. She exhibits no distension. There is no tenderness. There is no rebound and no guarding. Musculoskeletal: Normal range of motion. Minimal tenderness to c spine. No crepitus. Neurological: She is alert and oriented to person, place, and time. Skin: Skin is warm. Large superficial abrasion to occipital scalp. Bleeding well controlled. Psychiatric: She has a normal mood and affect. MDM  Number of Diagnoses or Management Options  Subarachnoid bleed [facility]):   Diagnosis management comments: Head CT revealed subarachnoid hemorrhage. Explored occipital scalp wound and there is only a superficial abrasion. No obvious wound to staple/suture. Bleeding well controlled. Spoke to Neurosurgery (Dr. Dang Alvarez) who advised that the pt be admitted to the hospitalist service. [facility] no [facility] available. Called and did an ED to ED transfer.       ED Course       Procedures

## 2017-11-22 NOTE — PROGRESS NOTES
Admission Medication Reconciliation:    Information obtained from: Patient and Rx Query    Significant PMH/Disease States:   Past Medical History:   Diagnosis Date    CAD (coronary artery disease)     CAD (coronary artery disease), native coronary artery 2010    Diabetes ([facility] [address] 2010    Hypercholesterolemia 2010    Hypertension 2010    Mobility impaired 2013    Use cane and as needed wheelchair and walker      REYMUNDO (obstructive sleep apnea) 2010       Chief Complaint for this Admission:    Chief Complaint   Patient presents with    Head Injury    Fall       Allergies:  Review of patient's allergies indicates no known allergies. Prior to Admission Medications:   Prior to Admission Medications   Prescriptions Last Dose Informant Patient Reported? Taking?   aspirin delayed-release 81 mg tablet 2017 at Unknown time  Yes Yes   Sig: Take 81 mg by mouth daily. carvedilol (COREG) 25 mg tablet 2017 at pm  No Yes   Sig: TAKE 1 TABLET TWICE DAILY WITH MEALS   cholecalciferol (VITAMIN D3) 1,000 unit tablet 2017 at Unknown time  Yes Yes   Sig: Take 1,000 Units by mouth daily. felodipine (PLENDIL SR) 10 mg 24 hr tablet 2017 at Unknown time  No Yes   Sig: TAKE 1 TABLET EVERY DAY   gabapentin (NEURONTIN) 100 mg capsule 2017 at Unknown time  Yes Yes   Sig: Take 100 mg by mouth nightly. glipiZIDE SR (GLUCOTROL XL) 2.5 mg CR tablet 2017 at Unknown time  No Yes   Sig: TAKE 1 TABLET EVERY DAY (NEW DOSE)   insulin detemir (LEVEMIR FLEXTOUCH) 100 unit/mL (3 mL) inpn 2017 at Unknown time  No Yes   Si Units by SubCUTAneous route nightly.    losartan-hydroCHLOROthiazide (HYZAAR) 100-25 mg per tablet Unknown at Unknown time  No No   Sig: TAKE 1 TABLET EVERY DAY   metFORMIN (GLUCOPHAGE) 1,000 mg tablet 2017 at pm  No Yes   Sig: TAKE 1 TABLET TWICE DAILY ( WITH MEALS ) AS DIRECTED   sertraline (ZOLOFT) 25 mg tablet Unknown at Unknown time  No No Sig: TAKE 1 TABLET EVERY DAY   simvastatin (ZOCOR) 40 mg tablet 11/21/2017 at Unknown time  No Yes   Sig: TAKE 1 TABLET EVERY NIGHT   spironolactone (ALDACTONE) 25 mg tablet 11/21/2017 at Unknown time  No Yes   Sig: TAKE 1 TABLET EVERY DAY      Facility-Administered Medications: None         Comments/Recommendations:     Spoke with patient regarding allergies and PTA medications. Contact number for family is [phone]. 1) Confirmed NKDA. 2) Updated PTA medication list. Modified gabapentin (changed from 100-200 mg QHS to 100 mg QHS). Removed diabetic testing supplies and tramadol. Patient did not recognize Hyzaar and sertraline during interview. Per Rx Query Hyzaar 100-25 mg last filled 10/2/17 for #90 tablets and sertraline 25 mg last filled 11/8/17 for #90 tablets. Patient reports all of her last doses were yesterday. She has not taken any medications yet today.       Pollo Moreno, DanielD

## 2017-11-23 ENCOUNTER — APPOINTMENT (OUTPATIENT)
Dept: CT IMAGING | Age: 82
DRG: 087 | End: 2017-11-23
Attending: HOSPITALIST
Payer: MEDICARE

## 2017-11-23 VITALS
SYSTOLIC BLOOD PRESSURE: 132 MMHG | WEIGHT: 150.79 LBS | HEART RATE: 62 BPM | HEIGHT: 61 IN | OXYGEN SATURATION: 92 % | DIASTOLIC BLOOD PRESSURE: 48 MMHG | RESPIRATION RATE: 24 BRPM | BODY MASS INDEX: 28.47 KG/M2 | TEMPERATURE: 98.4 F

## 2017-11-23 PROBLEM — E83.42 HYPOMAGNESEMIA: Status: ACTIVE | Noted: 2017-11-23

## 2017-11-23 LAB
ANION GAP SERPL CALC-SCNC: 10 MMOL/L (ref 5–15)
ATRIAL RATE: 65 BPM
BASOPHILS # BLD: 0 K/UL (ref 0–0.1)
BASOPHILS NFR BLD: 0 % (ref 0–1)
BUN SERPL-MCNC: 17 MG/DL (ref 6–20)
BUN/CREAT SERPL: 21 (ref 12–20)
CALCIUM SERPL-MCNC: 8.4 MG/DL (ref 8.5–10.1)
CALCULATED P AXIS, ECG09: 43 DEGREES
CALCULATED R AXIS, ECG10: 64 DEGREES
CALCULATED T AXIS, ECG11: 54 DEGREES
CHLORIDE SERPL-SCNC: 106 MMOL/L (ref 97–108)
CO2 SERPL-SCNC: 22 MMOL/L (ref 21–32)
CREAT SERPL-MCNC: 0.82 MG/DL (ref 0.55–1.02)
DIAGNOSIS, 93000: NORMAL
EOSINOPHIL # BLD: 0.1 K/UL (ref 0–0.4)
EOSINOPHIL NFR BLD: 1 % (ref 0–7)
ERYTHROCYTE [DISTWIDTH] IN BLOOD BY AUTOMATED COUNT: 13.8 % (ref 11.5–14.5)
GLUCOSE BLD STRIP.AUTO-MCNC: 161 MG/DL (ref 65–100)
GLUCOSE BLD STRIP.AUTO-MCNC: 170 MG/DL (ref 65–100)
GLUCOSE BLD STRIP.AUTO-MCNC: 239 MG/DL (ref 65–100)
GLUCOSE SERPL-MCNC: 235 MG/DL (ref 65–100)
HCT VFR BLD AUTO: 35.5 % (ref 35–47)
HGB BLD-MCNC: 11.7 G/DL (ref 11.5–16)
LYMPHOCYTES # BLD: 2.1 K/UL (ref 0.8–3.5)
LYMPHOCYTES NFR BLD: 25 % (ref 12–49)
MAGNESIUM SERPL-MCNC: 1.4 MG/DL (ref 1.6–2.4)
MCH RBC QN AUTO: 31.5 PG (ref 26–34)
MCHC RBC AUTO-ENTMCNC: 33 G/DL (ref 30–36.5)
MCV RBC AUTO: 95.7 FL (ref 80–99)
MONOCYTES # BLD: 0.7 K/UL (ref 0–1)
MONOCYTES NFR BLD: 8 % (ref 5–13)
NEUTS SEG # BLD: 5.7 K/UL (ref 1.8–8)
NEUTS SEG NFR BLD: 66 % (ref 32–75)
P-R INTERVAL, ECG05: 202 MS
PHOSPHATE SERPL-MCNC: 3.3 MG/DL (ref 2.6–4.7)
PLATELET # BLD AUTO: 337 K/UL (ref 150–400)
POTASSIUM SERPL-SCNC: 3.9 MMOL/L (ref 3.5–5.1)
Q-T INTERVAL, ECG07: 458 MS
QRS DURATION, ECG06: 106 MS
QTC CALCULATION (BEZET), ECG08: 476 MS
RBC # BLD AUTO: 3.71 M/UL (ref 3.8–5.2)
SERVICE CMNT-IMP: ABNORMAL
SODIUM SERPL-SCNC: 138 MMOL/L (ref 136–145)
VENTRICULAR RATE, ECG03: 65 BPM
WBC # BLD AUTO: 8.6 K/UL (ref 3.6–11)

## 2017-11-23 PROCEDURE — 74011636637 HC RX REV CODE- 636/637: Performed by: HOSPITALIST

## 2017-11-23 PROCEDURE — 93005 ELECTROCARDIOGRAM TRACING: CPT

## 2017-11-23 PROCEDURE — 90471 IMMUNIZATION ADMIN: CPT

## 2017-11-23 PROCEDURE — 90686 IIV4 VACC NO PRSV 0.5 ML IM: CPT | Performed by: HOSPITALIST

## 2017-11-23 PROCEDURE — 82962 GLUCOSE BLOOD TEST: CPT

## 2017-11-23 PROCEDURE — 74011250637 HC RX REV CODE- 250/637: Performed by: HOSPITALIST

## 2017-11-23 PROCEDURE — 83735 ASSAY OF MAGNESIUM: CPT | Performed by: HOSPITALIST

## 2017-11-23 PROCEDURE — 36415 COLL VENOUS BLD VENIPUNCTURE: CPT | Performed by: HOSPITALIST

## 2017-11-23 PROCEDURE — 84100 ASSAY OF PHOSPHORUS: CPT | Performed by: HOSPITALIST

## 2017-11-23 PROCEDURE — 80048 BASIC METABOLIC PNL TOTAL CA: CPT | Performed by: HOSPITALIST

## 2017-11-23 PROCEDURE — 74011250636 HC RX REV CODE- 250/636: Performed by: HOSPITALIST

## 2017-11-23 PROCEDURE — 74011250636 HC RX REV CODE- 250/636: Performed by: INTERNAL MEDICINE

## 2017-11-23 PROCEDURE — 85025 COMPLETE CBC W/AUTO DIFF WBC: CPT | Performed by: HOSPITALIST

## 2017-11-23 PROCEDURE — 70450 CT HEAD/BRAIN W/O DYE: CPT

## 2017-11-23 RX ORDER — GLIPIZIDE 2.5 MG/1
5 TABLET, EXTENDED RELEASE ORAL
Qty: 60 TAB | Refills: 0 | Status: SHIPPED | OUTPATIENT
Start: 2017-11-24 | End: 2017-12-04 | Stop reason: DRUGHIGH

## 2017-11-23 RX ORDER — LOSARTAN POTASSIUM 100 MG/1
100 TABLET ORAL DAILY
Qty: 30 TAB | Refills: 0 | Status: SHIPPED | OUTPATIENT
Start: 2017-11-23 | End: 2017-12-04 | Stop reason: ALTCHOICE

## 2017-11-23 RX ORDER — MAGNESIUM SULFATE HEPTAHYDRATE 40 MG/ML
2 INJECTION, SOLUTION INTRAVENOUS ONCE
Status: COMPLETED | OUTPATIENT
Start: 2017-11-23 | End: 2017-11-23

## 2017-11-23 RX ADMIN — VITAMIN D, TAB 1000IU (100/BT) 1000 UNITS: 25 TAB at 08:32

## 2017-11-23 RX ADMIN — Medication 10 ML: at 15:43

## 2017-11-23 RX ADMIN — MAGNESIUM SULFATE HEPTAHYDRATE 2 G: 40 INJECTION, SOLUTION INTRAVENOUS at 14:57

## 2017-11-23 RX ADMIN — HYDROCHLOROTHIAZIDE: 25 TABLET ORAL at 08:33

## 2017-11-23 RX ADMIN — CARVEDILOL 25 MG: 12.5 TABLET, FILM COATED ORAL at 08:32

## 2017-11-23 RX ADMIN — FAMOTIDINE 20 MG: 20 TABLET, FILM COATED ORAL at 08:33

## 2017-11-23 RX ADMIN — CARVEDILOL 25 MG: 12.5 TABLET, FILM COATED ORAL at 16:18

## 2017-11-23 RX ADMIN — INSULIN LISPRO 3 UNITS: 100 INJECTION, SOLUTION INTRAVENOUS; SUBCUTANEOUS at 08:32

## 2017-11-23 RX ADMIN — SPIRONOLACTONE 25 MG: 25 TABLET ORAL at 08:33

## 2017-11-23 RX ADMIN — GLIPIZIDE 2.5 MG: 2.5 TABLET, FILM COATED, EXTENDED RELEASE ORAL at 07:01

## 2017-11-23 RX ADMIN — INSULIN LISPRO 3 UNITS: 100 INJECTION, SOLUTION INTRAVENOUS; SUBCUTANEOUS at 12:44

## 2017-11-23 RX ADMIN — SERTRALINE HYDROCHLORIDE 25 MG: 50 TABLET ORAL at 08:33

## 2017-11-23 RX ADMIN — FELODIPINE 10 MG: 5 TABLET, FILM COATED, EXTENDED RELEASE ORAL at 08:33

## 2017-11-23 RX ADMIN — INFLUENZA VIRUS VACCINE 0.5 ML: 15; 15; 15; 15 SUSPENSION INTRAMUSCULAR at 16:39

## 2017-11-23 RX ADMIN — Medication 10 ML: at 07:01

## 2017-11-23 NOTE — PROGRESS NOTES
Primary Nurse Chhaya Rossr, RN and Brody Azul RN performed a dual skin assessment on this patient Impairment noted- see wound doc flow sheet  Zaki score is 18

## 2017-11-23 NOTE — PROGRESS NOTES
Bedside shift change report given to [address] [place] [address] (oncoming nurse) by Óscar Bates (offgoing nurse). Report included the following information SBAR, Kardex, Procedure Summary, Intake/Output, MAR, Recent Results and Cardiac Rhythm SR w/BBB. Discussed completion of admission process except for dual skin assessment and review of stroke education. SCD motor ordered for pt.

## 2017-11-23 NOTE — DISCHARGE INSTRUCTIONS
ADDITIONAL CARE RECOMMENDATIONS:   1. Take medications as prescribed. 2. Keep appointment(s) as recommended/scheduled. 3. Due to age and risk of falls, HCTZ (hydrochlorothiazide, a diuretic) and metformin have been stopped. Please discuss these medication changes with your PCP. 4. Fall precautions. 5. Please have PCP monitor the cut on the back of the head. DIET: Cardiac Diet and Diabetic Diet    ACTIVITY: Activity as tolerated with fall precautions    WOUND CARE: none    EQUIPMENT needed: none       Preventing Falls: Care Instructions  Your Care Instructions    Getting around your home safely can be a challenge if you have injuries or health problems that make it easy for you to fall. Loose rugs and furniture in walkways are among the dangers for many older people who have problems walking or who have poor eyesight. People who have conditions such as arthritis, osteoporosis, or dementia also have to be careful not to fall. You can make your home safer with a few simple measures. Follow-up care is a key part of your treatment and safety. Be sure to make and go to all appointments, and call your doctor if you are having problems. It's also a good idea to know your test results and keep a list of the medicines you take. How can you care for yourself at home? Taking care of yourself  · You may get dizzy if you do not drink enough water. To prevent dehydration, drink plenty of fluids, enough so that your urine is light yellow or clear like water. Choose water and other caffeine-free clear liquids. If you have kidney, heart, or liver disease and have to limit fluids, talk with your doctor before you increase the amount of fluids you drink. · Exercise regularly to improve your strength, muscle tone, and balance. Walk if you can. Swimming may be a good choice if you cannot walk easily. · Have your vision and hearing checked each year or any time you notice a change.  If you have trouble seeing and hearing, you might not be able to avoid objects and could lose your balance. · Know the side effects of the medicines you take. Ask your doctor or pharmacist whether the medicines you take can affect your balance. Sleeping pills or sedatives can affect your balance. · Limit the amount of alcohol you drink. Alcohol can impair your balance and other senses. · Ask your doctor whether calluses or corns on your feet need to be removed. If you wear loose-fitting shoes because of calluses or corns, you can lose your balance and fall. · Talk to your doctor if you have numbness in your feet. Preventing falls at home  · Remove raised doorway thresholds, throw rugs, and clutter. Repair loose carpet or raised areas in the floor. · Move furniture and electrical cords to keep them out of walking paths. · Use nonskid floor wax, and wipe up spills right away, especially on ceramic tile floors. · If you use a walker or cane, put rubber tips on it. If you use crutches, clean the bottoms of them regularly with an abrasive pad, such as steel wool. · Keep your house well lit, especially [facility], and outside walkways. Use night-lights in areas such as hallways and bathrooms. Add extra light switches or use remote switches (such as switches that go on or off when you clap your hands) to make it easier to turn lights on if you have to get up during the night. · Install sturdy handrails on stairways. · Move items in your cabinets so that the things you use a lot are on the lower shelves (about waist level). · Keep a cordless phone and a flashlight with new batteries by your bed. If possible, put a phone in each of the main rooms of your house, or carry a cell phone in case you fall and cannot reach a phone. Or, you can wear a device around your neck or wrist. You push a button that sends a signal for help. · Wear low-heeled shoes that fit well and give your feet good support. Use footwear with nonskid soles.  Check the heels and soles of your shoes for wear. Repair or replace worn heels or soles. · Do not wear socks without shoes on wood floors. · Walk on the grass when the sidewalks are slippery. If you live in an area that gets snow and ice in the winter, sprinkle salt on slippery steps and sidewalks. Preventing falls in the bath  · Install grab bars and nonskid mats inside and outside your shower or tub and near the toilet and sinks. · Use shower chairs and bath benches. · Use a hand-held shower head that will allow you to sit while showering. · Get into a tub or shower by putting the weaker leg in first. Get out of a tub or shower with your strong side first.  · Repair loose toilet seats and consider installing a raised toilet seat to make getting on and off the toilet easier. · Keep your bathroom door unlocked while you are in the shower. Where can you learn more? Go to http://hemalatha-ebony.info/. Enter [id number] [id number] in the search box to learn more about \"Preventing Falls: Care Instructions. \"  Current as of: May 12, 2017  Content Version: 11.4  © 4099-9935 [email or web address]. Care instructions adapted under license by [employer] (which disclaims liability or warranty for this information). If you have questions about a medical condition or this instruction, always ask your healthcare professional. [place] [address] any warranty or liability for your use of this information. Learning About Subarachnoid Hemorrhage  What is a subarachnoid hemorrhage? A hemorrhage is the bursting of a blood vessel. The space between the brain and the tissue that covers the brain is called the subarachnoid space. So this kind of hemorrhage is bleeding in that space just outside the brain. When blood spills into this small space, it builds up and presses on the brain. This often causes sudden and severe head pain.  Other symptoms sometimes include nausea and vomiting, neck pain, or vision problems. Some people pass out or have a seizure. But the most common symptom is what many people describe as \"the worst headache of my life. \"  This hemorrhage is a type of stroke. Quick treatment is needed to prevent brain damage and death. People who have brain damage from a subarachnoid hemorrhage may have a hard time talking, understanding things, and making decisions. They may have to relearn daily activities, such as how to eat, bathe, and dress. How well someone recovers depends on how quickly the person gets to the hospital and how severe the hemorrhage was. A stroke rehab program may help. What causes it? Most subarachnoid hemorrhages are caused when a brain aneurysm bursts. An aneurysm (say \"YEP-pll-otl-um\") is a bulging, weak area in the wall of an artery that supplies blood to the brain. It can be hard to know what exactly caused a brain aneurysm and why it burst. Many things can raise the risk of this, such as smoking, high blood pressure, and a family history of aneurysms. This type of hemorrhage can also be caused by a head injury. How is it treated? The goal of treatment is to prevent brain damage, more bleeding, and other serious problems. You will likely be in the hospital's intensive care unit, where your medical team can keep a close watch on you. They will work to control your blood pressure, manage pain, and watch for symptoms of brain damage. You may have more tests to find out for sure that an aneurysm caused the bleeding. If you have an aneurysm, you may have a surgery to fix it. This can help prevent another bleeding episode. Two types of surgery can be used:  · Clipping. The doctor makes cuts (incisions) in your scalp and through the bone of your skull. Then the doctor places a tiny metal clip over the weak part of the blood vessel. This stops the flow of blood. · Coiling. The doctor makes a cut in your groin.  Then the doctor moves a small plastic tube through the cut and into a blood vessel. The tube is called a catheter. Using X-rays, the doctor gently guides the catheter through the blood vessel up to the brain aneurysm. Then the doctor uses a tool to fill up the aneurysm with tiny wire coils or block the opening. This keeps blood from getting back into the aneurysm. You will need treatment even if your symptoms go away. This is because there is a good chance that the area will bleed again. Follow-up care is a key part of your treatment and safety. Be sure to make and go to all appointments, and call your doctor if you are having problems. It's also a good idea to know your test results and keep a list of the medicines you take. Where can you learn more? Go to http://hemalatha-ebony.info/. Enter C037 in the search box to learn more about \"Learning About Subarachnoid Hemorrhage. \"  Current as of: March 20, 2017  Content Version: 11.4  © 9910-8259 [employer] Care instructions adapted under license by [employer] (which disclaims liability or warranty for this information). If you have questions about a medical condition or this instruction, always ask your healthcare professional. [place] [address] any warranty or liability for your use of this information.

## 2017-11-23 NOTE — INTERDISCIPLINARY ROUNDS
IDR/SLIDR Summary          Patient: Morro Jo MRN: [id number]    Age: 80 y.o. YOB: 1933 Room/Bed: [facility]   Admit Diagnosis: SAH (subarachnoid hemorrhage) ([facility] [address]  Fall  Principal Diagnosis: <principal problem not specified>   Goals: Safety, PT/OT, BP control, Repeat CT  Readmission: NO  Quality Measure: Not applicable  VTE Prophylaxis: Mechanical  Influenza Vaccine screening completed? YES  Pneumococcal Vaccine screening completed? YES  Mobility needs: Yes   Nutrition plan:Yes  Consults: P. T and O.T. Financial concerns:No  Escalated to CM? NO  RRAT Score: 27   Interventions:Home Health  Testing due for pt today?  YES  LOS: 1 days Expected length of stay 2-3 days  Discharge plan: TBD   PCP: José Miguel Starks MD  Transportation needs: No    Days before discharge:two or more days before discharge   Discharge disposition: TBD    Signed:     Ofe Butler RN  11/23/2017  12:50 AM

## 2017-11-23 NOTE — DISCHARGE SUMMARY
Discharge Summary       PATIENT ID: Darron Burton  MRN: [id number]   YOB: 1933    DATE OF ADMISSION: 11/22/2017  2:00 PM    DATE OF DISCHARGE: 11/23/17   PRIMARY CARE PROVIDER: Ene Castillo MD     ATTENDING PHYSICIAN: Stephon Bautista MD, S  DISCHARGING PROVIDER: Stephon Bautista MD, S    To contact this individual call [phone] and ask the  to page. If unavailable ask to be transferred the Adult Hospitalist Department. CONSULTATIONS: IP CONSULT TO NEUROSURGERY    PROCEDURES/SURGERIES: * No surgery found *  11/22 CT head wo contrast  11/22 CT C-spine wo contrast  11/23 CT head wo contrast    [address] COURSE:   79 yo woman with CAD, HTN, DM2, and REYMUNDO was transferred from [facility] ED to [address] [place] [address] ED on 11/22/17 for right frontal SAH s/p fall in the bathtub. Traumatic acute bilateral subarachnoid hemorrhage s/p GLF (POA)  - Neuro checks  - keep SBP<140  - repeat CT head 11/23 unchanged  - NSGY following: no surgical intervention indicated  - d/c'd ASA  - PT/OT evals could not be done today but patient ambulated well and did stairs with nurse; daughter declined offer of home safety eval  - fall precautions  - advised stopping HCTZ and metformin due to age  [de-identified]  Uncontrolled HTN  - controlled with home meds resumed  - PRN hydralazine and labetalol      DM2   - A1c 7.8%; resume home Levemir  - stop metformin and c/w glipizide  - FBSs, SSI      Hypomagnesemia - replete prn    DISCHARGE DIAGNOSES / PLAN:      Stable for discharge home to self-care. Follow up with PCP and Neurosurgery.  Daughter declined offer of home safety eval.       PENDING TEST RESULTS:   At the time of discharge the following test results are still pending: none    FOLLOW UP APPOINTMENTS:    Follow-up Information     Follow up With Details Comments [address], MD In 1 week hospital follow up Beckynddebora Dawson [place] [id number]  [phone]      Everton Laureano MD In 3 weeks Neurosurgery; hospital follow up [address]  [phone]             ADDITIONAL CARE RECOMMENDATIONS:   1. Take medications as prescribed. 2. Keep appointment(s) as recommended/scheduled. 3. Due to age and risk of falls, HCTZ (hydrochlorothiazide, a diuretic) and metformin have been stopped. Please discuss these medication changes with your PCP. 4. Fall precautions. 5. Please have PCP monitor the cut on the back of the head. DIET: Cardiac Diet and Diabetic Diet    ACTIVITY: Activity as tolerated with fall precautions    WOUND CARE: none    EQUIPMENT needed: none    DISCHARGE MEDICATIONS:  Current Discharge Medication List      START taking these medications    Details   losartan (COZAAR) 100 mg tablet Take 1 Tab by mouth daily. Indications: hypertension  Qty: 30 Tab, Refills: 0         CONTINUE these medications which have CHANGED    Details   glipiZIDE SR (GLUCOTROL XL) 2.5 mg CR tablet Take 2 Tabs by mouth Daily (before breakfast). Indications: type 2 diabetes mellitus  Qty: 60 Tab, Refills: 0         CONTINUE these medications which have NOT CHANGED    Details   gabapentin (NEURONTIN) 100 mg capsule Take 100 mg by mouth nightly. simvastatin (ZOCOR) 40 mg tablet TAKE 1 TABLET EVERY NIGHT  Qty: 90 Tab, Refills: 1      spironolactone (ALDACTONE) 25 mg tablet TAKE 1 TABLET EVERY DAY  Qty: 90 Tab, Refills: 1      insulin detemir (LEVEMIR FLEXTOUCH) 100 unit/mL (3 mL) inpn 45 Units by SubCUTAneous route nightly. Qty: 15 Pen, Refills: 1      felodipine (PLENDIL SR) 10 mg 24 hr tablet TAKE 1 TABLET EVERY DAY  Qty: 90 Tab, Refills: 1      carvedilol (COREG) 25 mg tablet TAKE 1 TABLET TWICE DAILY WITH MEALS  Qty: 180 Tab, Refills: 3      cholecalciferol (VITAMIN D3) 1,000 unit tablet Take 1,000 Units by mouth daily.       sertraline (ZOLOFT) 25 mg tablet TAKE 1 TABLET EVERY DAY  Qty: 90 Tab, Refills: 1         STOP taking these medications       aspirin delayed-release 81 mg tablet Comments:   Reason for Stopping:         metFORMIN (GLUCOPHAGE) 1,000 mg tablet Comments:   Reason for Stopping:         losartan-hydroCHLOROthiazide (HYZAAR) 100-25 mg per tablet Comments:   Reason for Stopping:         aspirin 81 mg tablet Comments:   Reason for Stopping:             NOTIFY YOUR PHYSICIAN FOR ANY OF THE FOLLOWING:   Fever over 101 degrees for 24 hours. Chest pain, shortness of breath, fever, chills, nausea, vomiting, diarrhea, change in mentation, falling, weakness, bleeding. Severe pain or pain not relieved by medications. Or, any other signs or symptoms that you may have questions about.     DISPOSITION:  x  Home With:   OT  PT  HH  RN       Long term SNF/Inpatient Rehab    Independent/assisted living    Hospice    Other:       PATIENT CONDITION AT DISCHARGE:     Functional status    Poor     Deconditioned    x Independent      Cognition   x  Lucid     Forgetful     Dementia      Catheters/lines (plus indication)    Rosado     PICC     PEG    x None      Code status     Full code    x DNR      PHYSICAL EXAMINATION AT DISCHARGE:  Visit Vitals    BP (!) 112/39 (BP 1 Location: Right arm, BP Patient Position: At rest)    Pulse (!) 57    Temp 98.4 °F (36.9 °C)    Resp 24    Ht 5' 1\" (1.549 m)    Wt 68.4 kg (150 lb 12.7 oz)    SpO2 92%    Breastfeeding No    BMI 28.49 kg/m2     Constitutional:  awake, no acute distress, cooperative, pleasant    ENT:  oral mucosa moist, oropharynx benign  Neck supple, no masses   Resp:  CTA bilaterally, no wheezing/rhonchi/rales   CV:  regular rhythm, normal rate, no m/r/g appreciated, no edema, +pulses    GI:  +BS, soft, non distended, non tender     Musculoskeletal:  moves all extremities    Neurologic:  AOx3, NFD                                             Skin:  warm, dry, occipital laceration  Eyes:  PERRL, EOMI      Labs  Recent Results (from the past 24 hour(s))   GLUCOSE, POC    Collection Time: 11/22/17  9:00 PM   Result Value Ref Range    Glucose (POC) 182 (H) 65 - 100 mg/dL    Performed by LEONARDO BAIRD(CON)    EKG, 12 LEAD, INITIAL    Collection Time: 11/23/17 12:00 AM   Result Value Ref Range    Ventricular Rate 65 BPM    Atrial Rate 65 BPM    P-R Interval 202 ms    QRS Duration 106 ms    Q-T Interval 458 ms    QTC Calculation (Bezet) 476 ms    Calculated P Axis 43 degrees    Calculated R Axis 64 degrees    Calculated T Axis 54 degrees    Diagnosis       Normal sinus rhythm  Cannot rule out Anterior infarct , age undetermined  When compared with ECG of 06-FEB-1999 06:01,    Criteria for Inferior infarct are no longer present  Confirmed by Jose Dumas M.D., Dania Aleman ([id number]) on 11/23/2017 10:10:23 AM     CBC WITH AUTOMATED DIFF    Collection Time: 11/23/17  2:39 AM   Result Value Ref Range    WBC 8.6 3.6 - 11.0 K/uL    RBC 3.71 (L) 3.80 - 5.20 M/uL    HGB 11.7 11.5 - 16.0 g/dL    HCT 35.5 35.0 - 47.0 %    MCV 95.7 80.0 - 99.0 FL    MCH 31.5 26.0 - 34.0 PG    MCHC 33.0 30.0 - 36.5 g/dL    RDW 13.8 11.5 - 14.5 %    PLATELET 119 104 - 126 K/uL    NEUTROPHILS 66 32 - 75 %    LYMPHOCYTES 25 12 - 49 %    MONOCYTES 8 5 - 13 %    EOSINOPHILS 1 0 - 7 %    BASOPHILS 0 0 - 1 %    ABS. NEUTROPHILS 5.7 1.8 - 8.0 K/UL    ABS. LYMPHOCYTES 2.1 0.8 - 3.5 K/UL    ABS. MONOCYTES 0.7 0.0 - 1.0 K/UL    ABS. EOSINOPHILS 0.1 0.0 - 0.4 K/UL    ABS.  BASOPHILS 0.0 0.0 - 0.1 K/UL   METABOLIC PANEL, BASIC    Collection Time: 11/23/17  2:39 AM   Result Value Ref Range    Sodium 138 136 - 145 mmol/L    Potassium 3.9 3.5 - 5.1 mmol/L    Chloride 106 97 - 108 mmol/L    CO2 22 21 - 32 mmol/L    Anion gap 10 5 - 15 mmol/L    Glucose 235 (H) 65 - 100 mg/dL    BUN 17 6 - 20 MG/DL    Creatinine 0.82 0.55 - 1.02 MG/DL    BUN/Creatinine ratio 21 (H) 12 - 20      GFR est AA >60 >60 ml/min/1.73m2    GFR est non-AA >60 >60 ml/min/1.73m2    Calcium 8.4 (L) 8.5 - 10.1 MG/DL   MAGNESIUM    Collection Time: 11/23/17  2:39 AM   Result Value Ref Range    Magnesium 1.4 (L) 1.6 - 2.4 mg/dL   PHOSPHORUS    Collection Time: 11/23/17  2:39 AM   Result Value Ref Range    Phosphorus 3.3 2.6 - 4.7 MG/DL   GLUCOSE, POC    Collection Time: 11/23/17  8:22 AM   Result Value Ref Range    Glucose (POC) 161 (H) 65 - 100 mg/dL    Performed by Rachell Powers, POC    Collection Time: 11/23/17 11:34 AM   Result Value Ref Range    Glucose (POC) 170 (H) 65 - 100 mg/dL    Performed by Shane Cramer        CHRONIC MEDICAL DIAGNOSES:  Problem List as of 11/23/2017  Date Reviewed: 11/23/2017          Codes Class Noted - Resolved    Hypomagnesemia ICD-10-CM: E83.42  ICD-9-CM: 275.2  11/23/2017 - Present        * (Principal)SAH (subarachnoid hemorrhage) ([facility] ICD-10-CM: I60.9  ICD-9-CM: 966  11/22/2017 - Present        Fall ICD-10-CM: W19. Anastacia Fluke  ICD-9-CM: E888.9  11/22/2017 - Present        Advanced directives, counseling/discussion ICD-10-CM: Z71.89  ICD-9-CM: V65.49  5/5/2017 - Present        Type 2 diabetes mellitus with circulatory disorder, with long-term current use of insulin ([facility] ICD-10-CM: E11.59, Z79.4  ICD-9-CM: 250.70, V58.67  10/5/2016 - Present        HTN (hypertension), benign ICD-10-CM: I10  ICD-9-CM: 401.1  10/5/2016 - Present        Coronary artery disease involving native coronary artery of native heart without angina pectoris ICD-10-CM: I25.10  ICD-9-CM: 414.01  4/7/2016 - Present        Urinary incontinence without sensory awareness ICD-10-CM: N39.42  ICD-9-CM: 788.34  4/7/2016 - Present        Type 2 diabetes mellitus with circulatory disorder ([facility] ICD-10-CM: E11.59  ICD-9-CM: 250.70  12/12/2013 - Present        Mobility impaired ICD-10-CM: Z74.09  ICD-9-CM: 799.89  12/12/2013 - Present    Overview Signed 12/12/2013 11:00 AM by Osvaldo Guaman MD     Use cane and as needed wheelchair and walker                 Vitamin D deficiency ICD-10-CM: E55.9  ICD-9-CM: 268.9  3/28/2012 - Present        Diabetes mellitus with renal manifestations, uncontrolled ([facility] ICD-10-CM: E11.29, E11.65  ICD-9-CM: 250.42  3/28/2012 - Present        Diabetes ([facility] ICD-10-CM: E11.9  ICD-9-CM: 250.00  12/20/2010 - Present        CAD (coronary artery disease), native coronary artery ICD-10-CM: I25.10  ICD-9-CM: 414.01  6/18/2010 - Present        Hypercholesterolemia ICD-10-CM: E78.00  ICD-9-CM: 272.0  6/18/2010 - Present        REYMUNDO (obstructive sleep apnea) ICD-10-CM: G47.33  ICD-9-CM: 327.23  6/18/2009 - Present    Overview Signed 12/20/2010 11:27 AM by Ana Dsouza MD     Not using CPAP             RESOLVED: CAP (community acquired pneumonia) ICD-10-CM: J18.9  ICD-9-CM: 861  1/3/2017 - 5/5/2017        RESOLVED: Hypertension ICD-10-CM: I10  ICD-9-CM: 401.9  6/18/2010 - 10/5/2016              Greater than 30 minutes were spent with the patient on counseling and coordination of care.     Signed:   Rosealee Blizzard, MD  11/23/2017  3:44 PM

## 2017-11-23 NOTE — PROGRESS NOTES
Pt arrived per stretcher from ER accompanied by transport tech. Orders released in ER so pt could have supper tray delivered. Call placed to dietary; no answer.

## 2017-11-23 NOTE — ROUTINE PROCESS
Bedside shift change report given to Matt Pinto RN (oncoming nurse) by Ananth Murphy RN (offgoing nurse). Report included the following information SBAR, Kardex, ED Summary, Procedure Summary, Intake/Output, MAR, Accordion, Recent Results and Cardiac Rhythm NSR.

## 2017-11-23 NOTE — PROGRESS NOTES
1430:     RN ambulated patient in hallway 250 feet with CGA and 2 wheeled walker. Patient able to ascend and descend 12 steps using rails. No assist from RN. Patient states she is able to ambulate as she would need to at home. /48 post activity. Maintains O2 saturation >90% throughout activity. 1500:     RN spoke via telephone with neurosurgery. Discussed follow up CT results - stable bleed. No changes seen. Neurosurgery has no plan for surgical intervention. Patient is to return to daughter's home. Will have 24-hour supervision at home.

## 2017-11-23 NOTE — PROGRESS NOTES
Hospitalist Progress Note  Delbert Hernández MD  Answering service: [phone] from in house phone      Date of Service:  2017  NAME:  Cosmo Telles  :  12/3/1933  MRN:  [id number]      Admission Summary:   81 yo woman with CAD, HTN, DM2, and REYMUNDO was transferred from [facility] ED to [facility] ED on 17 for right frontal SAH s/p fall in the bathtub. Interval history / Subjective:   Denies complaints; stated she walked to the bathroom with staff earlier; no overnight events per nurse     Assessment & Plan:     Traumatic acute bilateral subarachnoid hemorrhage s/p GLF (POA)  - Neuro checks  - keep SBP<140  - repeat CT head  unchanged  - NSGY following: no surgical intervention indicated  - d/c'd ASA  - PT/OT evals  - fall precautions     Uncontrolled HTN  - controlled with home meds resumed  - PRN hydralazine and labetalol     DM2   - A1c 7.8%; resume home Levemir ([employer] formulary sub)  - hold metformin and c/w glipizide  - FBSs, SSI     Hypomagnesemia - replete prn    Code status: DNR  DVT prophylaxis: SCDs    Care Plan discussed with: Patient/Family and Nurse  Disposition: TBD pending PT/OT evals. Hospital Problems  Date Reviewed: 2017          Codes Class Noted POA    * (Principal)SAH (subarachnoid hemorrhage) ([facility] [address] ICD-10-CM: I60.9  ICD-9-CM: 170  2017 Yes        Fall ICD-10-CM: W19. Cecily Thomason  ICD-9-CM: V916.2  2017 Unknown            Review of Systems:   Pertinent items are noted in HPI. Vital Signs:    Last 24hrs VS reviewed since prior progress note.  Most recent are:  Visit Vitals    /50 (BP 1 Location: Left arm, BP Patient Position: At rest)    Pulse (!) 57    Temp 98.5 °F (36.9 °C)    Resp 21    Ht 5' 1\" (1.549 m)    Wt 68.4 kg (150 lb 12.7 oz)    SpO2 93%    Breastfeeding No    BMI 28.49 kg/m2     No intake or output data in the 24 hours ending 17 1146     Physical Examination: Constitutional:  awake, no acute distress, cooperative, pleasant    ENT:  oral mucosa moist, oropharynx benign  Neck supple, no masses   Resp:  CTA bilaterally, no wheezing/rhonchi/rales   CV:  regular rhythm, normal rate, no m/r/g appreciated, no edema, +pulses    GI:  +BS, soft, non distended, non tender     Musculoskeletal:  moves all extremities    Neurologic:  AOx3, NFD     Skin:  warm, dry, occipital laceration  Eyes:  PERRL, EOMI    Data Review:    Review and/or order of clinical lab test  Review and/or order of tests in the radiology section of CPT  Review and/or order of tests in the medicine section of CPT    Labs:     Recent Labs      11/23/17   0239 11/22/17   1207   WBC  8.6  12.3*   HGB  11.7  13.0   HCT  35.5  39.1   PLT  337  358     Recent Labs      11/23/17   0239 11/22/17   1207   NA  138  142   K  3.9  4.2   CL  106  105   CO2  22  25   BUN  17  19   CREA  0.82  0.82   GLU  235*  117*   CA  8.4*  9.6   MG  1.4*   --    PHOS  3.3   --      No results for input(s): SGOT, GPT, ALT, AP, TBIL, TBILI, TP, ALB, GLOB, GGT, AML, LPSE in the last 72 hours. No lab exists for component: AMYP, HLPSE  Recent Labs      11/22/17   1207   INR  1.1   PTP  10.7      No results for input(s): FE, TIBC, PSAT, FERR in the last 72 hours. No results found for: FOL, RBCF   No results for input(s): PH, PCO2, PO2 in the last 72 hours. No results for input(s): CPK, CKNDX, TROIQ in the last 72 hours.     No lab exists for component: CPKMB  Lab Results   Component Value Date/Time    Cholesterol, total 145 05/05/2017 11:48 AM    HDL Cholesterol 43 05/05/2017 11:48 AM    LDL, calculated 64 05/05/2017 11:48 AM    Triglyceride 192 05/05/2017 11:48 AM    CHOL/HDL Ratio 3.3 09/20/2010 10:33 AM     Lab Results   Component Value Date/Time    Glucose (POC) 170 11/23/2017 11:34 AM    Glucose (POC) 161 11/23/2017 08:22 AM    Glucose (POC) 182 11/22/2017 09:00 PM     No results found for: COLOR, APPRN, SPGRU, REFSG, AUDELIA, PROTU, Manjeet Sernaing, BILU, UROU, EASTON, LEUKU, GLUKE, EPSU, BACTU, WBCU, RBCU, CASTS, UCRY    Medications Reviewed:     Current Facility-Administered Medications   Medication Dose Route Frequency    carvedilol (COREG) tablet 25 mg  25 mg Oral BID WITH MEALS    cholecalciferol (VITAMIN D3) tablet 1,000 Units  1,000 Units Oral DAILY    felodipine (PLENDIL SR) 24 hr tablet 10 mg  10 mg Oral DAILY    glipiZIDE SR (GLUCOTROL XL) tablet 2.5 mg  2.5 mg Oral ACB    sertraline (ZOLOFT) tablet 25 mg  25 mg Oral DAILY    simvastatin (ZOCOR) tablet 40 mg  40 mg Oral QHS    spironolactone (ALDACTONE) tablet 25 mg  25 mg Oral DAILY    ondansetron (ZOFRAN) injection 4 mg  4 mg IntraVENous Q6H PRN    labetalol (NORMODYNE;TRANDATE) injection 10 mg  10 mg IntraVENous Q15MIN PRN    Or    hydrALAZINE (APRESOLINE) 20 mg/mL injection 10 mg  10 mg IntraVENous Q15MIN PRN    insulin lispro (HUMALOG) injection   SubCUTAneous AC&HS    glucose chewable tablet 16 g  4 Tab Oral PRN    glucagon (GLUCAGEN) injection 1 mg  1 mg IntraMUSCular PRN    dextrose (D50W) injection syrg 12.5-25 g  25-50 mL IntraVENous PRN    famotidine (PEPCID) tablet 20 mg  20 mg Oral DAILY    acetaminophen (TYLENOL) tablet 650 mg  650 mg Oral Q4H PRN    Or    acetaminophen (TYLENOL) suppository 650 mg  650 mg Rectal Q4H PRN    insulin glargine (LANTUS) injection 40 Units  40 Units SubCUTAneous QHS    losartan/hydroCHLOROthiazide (HYZAAR) 100/25 mg   Oral DAILY    influenza vaccine 2017-18 (3 yrs+)(PF) (FLUZONE QUAD/FLUARIX QUAD) injection 0.5 mL  0.5 mL IntraMUSCular PRIOR TO DISCHARGE    sodium chloride (NS) flush 5-10 mL  5-10 mL IntraVENous Q8H    sodium chloride (NS) flush 5-10 mL  5-10 mL IntraVENous PRN     ______________________________________________________________________  EXPECTED LENGTH OF STAY: - - -  ACTUAL LENGTH OF STAY:          1                 July Benites MD

## 2017-11-24 ENCOUNTER — PATIENT OUTREACH (OUTPATIENT)
Dept: INTERNAL MEDICINE CLINIC | Age: 82
End: 2017-11-24

## 2017-11-24 NOTE — PROGRESS NOTES
Stroke Education documented in Patient Education: YES  Core Measures Documented in Connect Care:  Risk Factors: YES  Warning signs of stroke: YES  When to Activate 911: YES  Medication Education for Risk Factors: YES  Smoking cessation if applicable: YES  Written Education Given:  YES    Discharge NIH Completed: YES  Score: 0    BRAINS: YES    Follow Up Appointment Made: YES - patient to call for appointments (Holiday discharge)  Date/Time if applicable:  Will follow up with PCP and Neurosurgery

## 2017-11-24 NOTE — PROGRESS NOTES
Patient was seen at [facility] ED post fall with head injury, she was transferred to [facility] for subarachnoid hemorrhage. Patient was kept overnight for observation and seen by neurosurgery. Neurosurgery determined need for surgical intervention was unlikely, repeat head CT was done prior to discharged with no new findings. Patient to follow up with neurosurgery (3 weeks) and PCP (1 week). Per notes, patient daughter declined home safety eval.  Patient is at high risk for falls, she has had multiple falls in recent past per office and hospital notes. Contacted patient and she agrees to [employer], but needs mid day and will come in on [email or web address].  We discussed earlier appt would be better and she will discuss with granddaughter if she can transport her next week. Patient has no questions about her discharge instructions. She will get her new prescriptions filled today (Losartan and glipizide). She will schedule an appt with the neurosurgeon for 3 weeks. Patient denies any vision changes, HA, or N/V. She has soreness at head injury site and took 2 tylenol last night at bedtime with good results. She uses a cane and walker to ambulate and has had issues with balance. NN discussed with patient her recent falls and balance issues. It was suggested that therapy may help with stability and prevent falls. She does not want to pursue this at this time and declines both HH and outpatient therapy. Patient is instructed on red flags related to head injury and need for fall prevention. Discussed this with  and NN will contact patient at beginning of next week and see if she will reconsider as this could be beneficial.  Will also see if patient is able to move NITHIN appt up as follow up next week would be better.

## 2017-11-27 ENCOUNTER — PATIENT OUTREACH (OUTPATIENT)
Dept: INTERNAL MEDICINE CLINIC | Age: 82
End: 2017-11-27

## 2017-11-27 NOTE — PROGRESS NOTES
Loy Gomez is a 80 y.o. female   This patient was received as a referral from [facility]   Inpatient RRAT Score: 27  Patient's challenges to self management identified:  lack of knowledge about disease, level of motivation, PCP relationship, transportation and utilization of services    Medication Management:  good adherence and good understanding    Summary of patients top three problems:     Problem 1: patient has had multiple falls in recent past.  Patient acknowledges balance issues, but declines HH PT or [facility] visit. Problem 2:patient splits her time between local area and [facility]. Stays with family when local, but does not drive. Patient's children work and are not always available to assist with transportation. She has grandchildren who assist as well, but seems reluctant to ask for assistance. Problem 3:     Patients motivational level on a scale of 0-10: unknown    Advance Care Planning:   Patient was offered the opportunity to discuss advance care planning:  no     Does patient have an Advance Directive:  no   If no, did you provide information on Advance Care Planning? Not at this time will attempt to have conversation during office visit. [employer], Referrals, and Durable Medical Equipment:walker, cane    Follow up appointments:  [email or web address].  Goals      Identification of barriers to adherence to a plan of care such as inability to afford medications, lack of insurance, lack of transportation, etc.      Prevent complications post hospitalization.  Supportive resources in place to maintain patient in the community (ie. Home Health, DME equipment, refer to, medication assistant plan, etc.)            encouraged patient to have [facility] for PT home safety eval.       Understands red flags post discharge. 11/24   Discussed red flags related to [facility]. Fall prevention. Offered HH PT or [facility] visit for home safety again.   Discussed options/benefits with both patient and her daughter and both have declined PT at this time. Daughter states she does not feel patient has balance issues. Daughter states patient only fell because of twisting the wrong way. She believes this is the case with both recent falls. Mrs. Viktor Alcazar continues to take tylenol as needed for HA pain, but states she only has headaches if she coughs. She reports that she coughs infrequently and has only needed tylenol a couple of times. She denies any current headache pain and has not taken any tylenol since yesterday. Patient/daughter will call today and schedule an appt with the neurosurgeon as per discharge instructions. Patient will discuss PT with provider during [address] on 12/4. Will see if patient has AMD and offer info to complete forms if needed. Earlier appt offered with another provider if patient needs to be seen before scheduled appt. Patient verbalized understanding of all information discussed. Patient has this Nurse Navigators contact information for any further questions, concerns, or needs.

## 2017-12-04 ENCOUNTER — OFFICE VISIT (OUTPATIENT)
Dept: INTERNAL MEDICINE CLINIC | Age: 82
End: 2017-12-04

## 2017-12-04 ENCOUNTER — PATIENT OUTREACH (OUTPATIENT)
Dept: INTERNAL MEDICINE CLINIC | Age: 82
End: 2017-12-04

## 2017-12-04 VITALS
TEMPERATURE: 97.5 F | HEART RATE: 64 BPM | RESPIRATION RATE: 16 BRPM | BODY MASS INDEX: 27.75 KG/M2 | HEIGHT: 61 IN | DIASTOLIC BLOOD PRESSURE: 70 MMHG | OXYGEN SATURATION: 98 % | SYSTOLIC BLOOD PRESSURE: 155 MMHG | WEIGHT: 147 LBS

## 2017-12-04 DIAGNOSIS — I10 HTN (HYPERTENSION), BENIGN: ICD-10-CM

## 2017-12-04 DIAGNOSIS — Z79.4 TYPE 2 DIABETES MELLITUS WITH OTHER CIRCULATORY COMPLICATION, WITH LONG-TERM CURRENT USE OF INSULIN (HCC): ICD-10-CM

## 2017-12-04 DIAGNOSIS — E11.59 TYPE 2 DIABETES MELLITUS WITH OTHER CIRCULATORY COMPLICATION, WITH LONG-TERM CURRENT USE OF INSULIN (HCC): ICD-10-CM

## 2017-12-04 DIAGNOSIS — I60.9 SAH (SUBARACHNOID HEMORRHAGE) (HCC): Primary | ICD-10-CM

## 2017-12-04 RX ORDER — METFORMIN HYDROCHLORIDE 1000 MG/1
1000 TABLET ORAL 2 TIMES DAILY WITH MEALS
COMMUNITY
End: 2018-08-08 | Stop reason: SDUPTHER

## 2017-12-04 RX ORDER — LOSARTAN POTASSIUM AND HYDROCHLOROTHIAZIDE 25; 100 MG/1; MG/1
TABLET ORAL
Qty: 90 TAB | Refills: 2 | Status: SHIPPED | OUTPATIENT
Start: 2017-12-04 | End: 2018-09-17 | Stop reason: SDUPTHER

## 2017-12-04 RX ORDER — FELODIPINE 10 MG/1
TABLET, EXTENDED RELEASE ORAL
Qty: 90 TAB | Refills: 1 | Status: SHIPPED | OUTPATIENT
Start: 2017-12-04 | End: 2018-07-05 | Stop reason: SDUPTHER

## 2017-12-04 RX ORDER — GLIPIZIDE 2.5 MG/1
TABLET, EXTENDED RELEASE ORAL
Qty: 90 TAB | Refills: 1 | Status: SHIPPED | OUTPATIENT
Start: 2017-12-04 | End: 2018-05-21 | Stop reason: ALTCHOICE

## 2017-12-04 RX ORDER — LOSARTAN POTASSIUM AND HYDROCHLOROTHIAZIDE 25; 100 MG/1; MG/1
1 TABLET ORAL DAILY
COMMUNITY
End: 2017-12-04 | Stop reason: SDUPTHER

## 2017-12-04 RX ORDER — GLIPIZIDE 2.5 MG/1
TABLET, EXTENDED RELEASE ORAL DAILY
COMMUNITY
End: 2017-12-04 | Stop reason: SDUPTHER

## 2017-12-04 NOTE — PROGRESS NOTES
Chief Complaint   Patient presents with   [facility] Follow Up     Pt had fall and hit head     Medication Evaluation     ER d/c metfromin, aspirin, HCTZ      This is an 80-year-old woman who   had a fall in the bathtub earlier this morning. She is unsure whether   she lost any consciousness. She states that she slipped and fell   backwards, hitting her head against the bathroom vanity. She also had   a fall several weeks ago. She currently is staying with her daughter   who lives here in [place] because the patient had a doctor's appointment in [place],   but she lives in [place] in a [facility] and says that was   where she had her last fall. With her last fall several weeks ago, she did not have loss of consciousness  but she did fall onto her right side and is still   complaining of some tenderness along her right arm. Hospitalist stopped some her usual meds she never did this    Ms. Concepcion Pinzon is a 80y.o. year old female, she is seen today for Transition of Care services following a hospital discharge for [facility] on 11/23/17. Our office Nurse Navigator performed an outreach to Ms. Evelyne Rebollar on 11/24 (within 2 business days of discharge) to complete medication reconciliation and a telephonic assessment of her condition.     PROCEDURES/SURGERIES: * No surgery found *  11/22 CT head wo contrast  11/22 CT C-spine wo contrast  11/23 CT head wo contrast     ADMITTING DIAGNOSES & HOSPITAL COURSE:   81 yo woman with CAD, HTN, DM2, and REYMUNDO was transferred from [facility] ED to [facility] ED on 11/22/17 for right frontal SAH s/p fall in the bathtub.     Traumatic acute bilateral subarachnoid hemorrhage s/p GLF (POA)  - Neuro checks  - keep SBP<140  - repeat CT head 11/23 unchanged  - NSGY following: no surgical intervention indicated  - d/c'd ASA  - PT/OT evals could not be done today but patient ambulated well and did stairs with nurse; daughter declined offer of home safety eval  - fall precautions  - advised stopping HCTZ and metformin due to age     Lab Results   Component Value Date/Time    GFR est AA >60 11/23/2017 02:39 AM    GFR est non-AA >60 11/23/2017 02:39 AM    Creatinine 0.82 11/23/2017 02:39 AM    BUN 17 11/23/2017 02:39 AM    Sodium 138 11/23/2017 02:39 AM    Potassium 3.9 11/23/2017 02:39 AM    Chloride 106 11/23/2017 02:39 AM    CO2 22 11/23/2017 02:39 AM     Lab Results   Component Value Date/Time    Hemoglobin A1c 7.3 03/22/2011 12:01 PM    Hemoglobin A1c (POC) 7.8 08/17/2017 02:00 PM     no apparent distress  \  Vitals:    12/04/17 1158 12/04/17 1222   BP: 170/76 155/70   Pulse: 64    Resp: 16    Temp: 97.5 °F (36.4 °C)    TempSrc: Oral    SpO2: 98%    Weight: 147 lb (66.7 kg)    Height: 5' 1\" (1.549 m)      Small abrasion post occiput  S1 and S2 normal, no murmurs, clicks, gallops or rubs. Regular rate and rhythm. Chest is clear; no wheezes or rales. No edema or JVD. Ears normal  gait slow    1. SAH (subarachnoid hemorrhage) ([address]  See neurosurgery 2 weeks stop aspirin now until seen    2. HTN (hypertension), benign  Controlled better    3.  Type 2 diabetes mellitus with other circulatory complication, with long-term current use of insulin (HCC)  Stable      Prolonged visit with 15 minutes of time out  of more than a  25 minute visit spent counseling patient and family and formulation of care

## 2017-12-04 NOTE — PATIENT INSTRUCTIONS
Preventing falls (The Basics)Written by the doctors and editors at [facility]     Am I at risk of falling?  Your risk of falling increases as you grow older. Thats because getting older can make it harder to walk steadily and keep your balance. Also, the effects of falls are more serious in older people. Overall, 3 to 4 out of every 10 people over the age of 72 fall each year. Up to 76 percent of people who fracture a hip never recover to the point they were before they had their fracture. If you have fallen in the past, you are at higher risk of falling again. Several things can increase your risk of a fall, including:  Illness   A change in the medicines you take   An unsafe or unfamiliar setting (for example, a room with rugs or furniture that might trip you, or an area you dont know well)    How can my doctor help me to avoid falling?  Your doctor can talk to you about the following things:  Past falls  It is important to tell your doctor about any times you have fallen or almost fallen. He or she can then suggest ways to prevent another fall. Your health conditions  Some health problems can put you at risk of falling. These include conditions that affect eyesight, hearing, muscle strength, or balance. The medicines you take  Certain medicines can increase the risk of falling. These include some medicines that are used for sleeping problems, anxiety, or depression. Adding new medicines, or changing doses of some medicines, can also affect your risk of falling. The more your doctor knows about your situation, the better he or she will be able to help you. For example, if you fell because you have a condition that causes pain, your doctor might suggest treatments to deal with the pain. Or if 1 of your medicines is making you dizzy and more likely to fall, your doctor might switch you to a different medicine. Is there anything I can do on my own?  Yes.  To help keep from falling, you can:  Make your home safer  To avoid falling at home, get rid of things that might make you trip or slip. This might include furniture, electrical cords, clutter, and loose rugs. Keep your home well lit so that you can easily see where you are going. Avoid storing things in high places so you dont have to reach or climb. Wear sturdy shoes that fit well  Wearing shoes with high heels or slippery soles, or shoes that are too loose can lead to falls. Walking around in bare feet, or only socks, can also increase your risk of falling. Take vitamin D pills  Taking vitamin D might lower the risk of falls in older people. This is because vitamin D helps make bones and muscles stronger. Your doctor can help you decide how much vitamin D to take. Stay active  Exercising on a regular basis can help lower your risk of falling. It is best to do a few different activities that help with both strength and balance. There are many kinds of exercise that can be safe for older people. These include walking, swimming, and [employer] Chi (a [employer] martial art that involves slow, gentle movements). Use a cane, walker, and other safety devices  If your doctor recommends that you use a cane or walker, be sure that its the right size and you know how to use it. There are other devices that might help you avoid falling, too. These include grab bars or a sturdy seat for the shower, and hand rails or treads for the stairs (to prevent slipping). If you worry that you could fall, there are also alarm buttons that let you call for help if you fall and cant get up. What should I do if I fall?  If you fall, see your doctor right away, even if you arent hurt. Your doctor can try to figure out what caused you to fall, and how likely you are to fall again. He or she will do an exam and talk to you about your health problems, medicines, and activities.  Then he or she can suggest things you can do to avoid falling again. Many older people have a hard time recovering after a fall. Doing things to prevent falling can help you to protect your health and independence. Muscle Conditioning: Exercises  Your Care Instructions  Here are some examples of exercises for muscle conditioning. Start each exercise slowly. Ease off the exercise if you start to have pain. Your doctor or physical therapist will tell you when you can start these exercises and which ones will work best for you. How to do the exercises  Wall push-ups        1. Stand facing a wall, about 12 to 18 inches away. 2. Place your hands on the wall at shoulder height. 3. Slowly bend your elbows and bring your face toward the wall, moving your hips and shoulders forward together. 4. Push slowly back to the starting position. 5. Start with 5 repetitions and work up to 8 to 12. 6. Rest for a minute, and repeat the exercise. Note: When you can do this exercise against a wall comfortably (without your muscles feeling tired), you can try it against a counter. Start with 5 repetitions again and work up to 8 to 12. You can then slowly progress to the end of a couch or a sturdy chair, and finally to the floor. Knee extension    1. While sitting in a chair, straighten one leg and hold while you slowly count to 5. Be sure you do not lock your knee. 2. Repeat 8 to 12 times. 3. Rest for a minute, and repeat the exercise. 4. Do the same exercise with the other leg. Note: If this exercise becomes easy, you can add a light weight around your ankle or tie an elastic resistance band to a chair leg and one ankle. Side-lying leg lift    1. Lie on your side, with your legs extended. Keep your hips straight up and down during this exercise. Do not let your top hip rock toward the back.  Support your head with your hand, and place the other hand on the floor near your waist.  2. Slowly raise your upper leg until it is about in line with your shoulder. Keep your toes pointed forward. 3. Slowly lower your leg to the starting position. 4. Repeat 8 to 12 times. 5. Rest for a minute, and repeat the exercise. 6. Turn to your other side and do the same exercise with your other leg. Note: If this exercise becomes easy, you can add a light weight around your ankle or tie an elastic resistance band to both ankles. Shallow standing knee bends      1. Stand with your hands lightly resting on a counter or chair in front of you with your feet shoulder-width apart. 2. Slowly bend your knees so that you squat down just like you were going to sit in a chair. Make sure your knees do not go in front of your toes. 3. Lower yourself about 6 inches. Your heels should remain on the floor at all times. 4. Rise slowly to a standing position. 5. Repeat 8 to 12 times. 6. Rest for a minute, and repeat the exercise. Follow-up care is a key part of your treatment and safety. Be sure to make and go to all appointments, and call your doctor if you are having problems. It's also a good idea to know your test results and keep a list of the medicines you take.

## 2017-12-04 NOTE — MR AVS SNAPSHOT
Visit Information Date & Time Provider Department Dept. Phone Encounter #  
 12/4/2017 11:30 AM Andre Byers MD [facility] [phone] [id number] Follow-up Instructions Return in about 3 months (around 3/4/2018). Follow-up and Disposition History Upcoming Health Maintenance Date Due  
 EYE EXAM RETINAL OR DILATED Q1 4/30/2014 GLAUCOMA SCREENING Q2Y 4/30/2015 HEMOGLOBIN A1C Q6M 2/17/2018 FOOT EXAM Q1 5/5/2018 MICROALBUMIN Q1 5/5/2018 LIPID PANEL Q1 5/5/2018 MEDICARE YEARLY EXAM 5/6/2018 DTaP/Tdap/Td series (2 - Td) 11/22/2027 Allergies as of 12/4/2017  Review Complete On: 12/4/2017 By: Todd Duvall LPN No Known Allergies Current Immunizations  Reviewed on 5/5/2017 Name Date Influenza High Dose Vaccine PF 11/9/2015, 11/3/2014 Influenza Vaccine 9/12/2013 Influenza Vaccine (Quad) PF 11/23/2017  4:39 PM  
 Influenza Vaccine Split 9/25/2012, 9/27/2011, 12/20/2010 Pneumococcal Conjugate (PCV-13) 11/1/2016 Pneumococcal Vaccine (Unspecified Type) 11/3/2014 Tdap 11/22/2017 12:51 PM  
 ZZZ-RETIRED (DO NOT USE) Pneumococcal Vaccine (Unspecified Type) 1/2/2002 Zoster Vaccine, Live 9/15/2016 Not reviewed this visit You Were Diagnosed With   
  
 Codes Comments SAH (subarachnoid hemorrhage) (HCC)    -  Primary ICD-10-CM: I60.9 ICD-9-CM: 352 HTN (hypertension), benign     ICD-10-CM: I10 
ICD-9-CM: 401.1 Type 2 diabetes mellitus with other circulatory complication, with long-term current use of insulin (HCC)     ICD-10-CM: E11.59, Z79.4 ICD-9-CM: 250.70, V58.67 Vitals BP Pulse Temp Resp Height(growth percentile) Weight(growth percentile) 155/70 64 97.5 °F (36.4 °C) (Oral) 16 5' 1\" (1.549 m) 147 lb (66.7 kg) SpO2 BMI OB Status Smoking Status 98% 27.78 kg/m2 Hysterectomy Never Smoker Vitals History BMI and BSA Data Body Mass Index Body Surface Area  
 27.78 kg/m 2 1.69 m 2 Preferred Pharmacy Pharmacy Name Phone [facility] [address] [facility] Your Updated Medication List  
  
   
This list is accurate as of: 12/4/17 12:37 PM.  Always use your most recent med list.  
  
  
  
  
 carvedilol 25 mg tablet Commonly known as:  COREG  
TAKE 1 TABLET TWICE DAILY WITH MEALS  
  
 felodipine 10 mg 24 hr tablet Commonly known as:  PLENDIL SR  
TAKE 1 TABLET EVERY DAY  
  
 gabapentin 100 mg capsule Commonly known as:  NEURONTIN Take 100 mg by mouth nightly. glipiZIDE SR 2.5 mg CR tablet Commonly known as:  GLUCOTROL XL Take 2 Tabs by mouth Daily (before breakfast). Indications: type 2 diabetes mellitus  
  
 insulin detemir 100 unit/mL (3 mL) Inpn Commonly known as:  LEVEMIR FLEXTOUCH  
45 Units by SubCUTAneous route nightly. losartan 100 mg tablet Commonly known as:  COZAAR Take 1 Tab by mouth daily. Indications: hypertension  
  
 sertraline 25 mg tablet Commonly known as:  ZOLOFT  
TAKE 1 TABLET EVERY DAY  
  
 simvastatin 40 mg tablet Commonly known as:  ZOCOR  
TAKE 1 TABLET EVERY NIGHT  
  
 spironolactone 25 mg tablet Commonly known as:  ALDACTONE  
TAKE 1 TABLET EVERY DAY  
  
 VITAMIN D3 1,000 unit tablet Generic drug:  cholecalciferol Take 1,000 Units by mouth daily. Follow-up Instructions Return in about 3 months (around 3/4/2018). Introducing [facility] SERVICES! [employer] introduces [employer] patient portal. Now you can access parts of your medical record, email your doctor's office, and request medication refills online. 1. In your internet browser, go to [email or web address] 2. Click on the First Time User? Click Here link in the Sign In box. You will see the New Member Sign Up page. 3. Enter your [employer] Access Code exactly as it appears below.  You will not need to use this code after youve completed the sign-up process. If you do not sign up before the expiration date, you must request a new code. · Posse Access Code: [email or web address] Expires: 2/21/2018  4:29 PM 
 
4. Enter the last four digits of your Social Security Number (xxxx) and Date of Birth (mm/dd/yyyy) as indicated and click Submit. You will be taken to the next sign-up page. 5. Create a Posse ID. This will be your Posse login ID and cannot be changed, so think of one that is secure and easy to remember. 6. Create a Posse password. You can change your password at any time. 7. Enter your Password Reset Question and Answer. This can be used at a later time if you forget your password. 8. Enter your e-mail address. You will receive e-mail notification when new information is available in [address]. 9. Click Sign Up. You can now view and download portions of your medical record. 10. Click the Download Summary menu link to download a portable copy of your medical information. If you have questions, please visit the Frequently Asked Questions section of the Posse website. Remember, Posse is NOT to be used for urgent needs. For medical emergencies, dial 911. Now available from your iPhone and Android! Please provide this summary of care documentation to your next provider. Your primary care clinician is listed as Michael Saunders. If you have any questions after today's visit, please call [phone].

## 2017-12-04 NOTE — LETTER
12/4/2017 3:27 PM 
 
Ms. Stephy Wilson [address] [address] Ms. Bowles, 
 
I have enclosed the fall prevention/balance exercise that I discussed with you over the phone. It was a pleasure to meet you and your daughter today. If you have any questions or concerns, please feel free to call me on my direct line at [phone]. My hours are Monday- Friday 8:00 am-4:30 pm. 
 
Thank you for allowing us to participate in your care. Sincerely, ANASTACIA Garrido RN,BSN / Nurse Navigator Phone [phone]  
[email or web address] 
[facility]: [phone] / Fax: [phone] 
Basil De [place], [address] 
[email or web address]

## 2017-12-04 NOTE — PROGRESS NOTES
Met with patient and daughter, Mainor Velázquez in exam room. Patient has medication bottles with her today. On review, patient has not changed home medication regimen since hospitalization. She continues to take medications as previously ordered. After review of medication with Provider it is determined that she will continue Metformin, glipizide and losartan/HCTZ as previously ordered. She will stop her ASA, that she has been taking, until she sees neurosurgery- on 12/19 and is instructed by her as to when to resume. Patient's medication list updated to reflect above changes. Patient c/o left hand numbness and will wear a brace at night and see if symptoms resolve. Patient ambulates with a cane and holds on to her daughters arm while in the office. She denies any issues with unsteady gait. She states she occasionally uses walls for balance. She is again encouraged to work with therapy to prevent falls. Patient and daughter both decline. NN give contact info (card) and request call if they change their minds. NN discusses AMD with patient and daughter. [facility] of [facility] is given to patient with detailed instructions on how to complete. Plan:    -Will follow up on medication adherence.    -follow up on AMD.  -mail patient balance exercise info, prevent falls.

## 2017-12-12 ENCOUNTER — PATIENT OUTREACH (OUTPATIENT)
Dept: INTERNAL MEDICINE CLINIC | Age: 82
End: 2017-12-12

## 2017-12-18 NOTE — PROGRESS NOTES
Contacted [facility] patient for weekly follow up call. She reviews red flags related to [facility]. She has mild soreness to back of her head at times, along with stiffness in her neck. Both resolve when she takes tylenol. She denies any recent falls. She has been wearing braces and night to help with hand numbness. She believes it has helped some. She is working on her AMD with her daughter's assistance and has no questions at this time. She agrees to have NN contact her next week after neuro surgery appt. Goals      Identification of barriers to adherence to a plan of care such as inability to afford medications, lack of insurance, lack of transportation, etc.            12/14  Medications reviewed during office visit and reconciled. All meds available and taking as instructed.  Prevent complications post hospitalization. 12/14  Fall prevention discuss with patient. Safe ambulation. Balance and fall info mailed to patient for review.  Supportive resources in place to maintain patient in the community (ie. Home Health, DME equipment, refer to, medication assistant plan, etc.)            encouraged patient to have [facility] for PT home safety eval.       Understands red flags post discharge. 11/24   Discussed red flags related to [facility]. Fall prevention. 12/14  Patient is able to review red flags related to [facility].

## 2017-12-19 ENCOUNTER — HOSPITAL ENCOUNTER (OUTPATIENT)
Dept: CT IMAGING | Age: 82
Discharge: HOME | End: 2017-12-19
Attending: NEUROLOGICAL SURGERY
Payer: MEDICARE

## 2017-12-19 DIAGNOSIS — I60.9 SUBARACHNOID HEMORRHAGE (HCC): ICD-10-CM

## 2017-12-19 PROCEDURE — 70450 CT HEAD/BRAIN W/O DYE: CPT

## 2017-12-21 ENCOUNTER — PATIENT OUTREACH (OUTPATIENT)
Dept: INTERNAL MEDICINE CLINIC | Age: 82
End: 2017-12-21

## 2017-12-21 NOTE — PROGRESS NOTES
Contacted patient she has followed up with neurosurgery as directed and CT of head was done. Patient was contacted yesterday by surgeon and instructed that hemorrhage has improved, no further follow up is needed. She may resume ASA next week. She denies any HA or pain to head or neck. She is cautions when ambulating and feels she has improved. She confirms receiving fall prevention/balance handout in the mail. She continues still feel she would not benefit from therapy, but agrees to contact office if she has change or wants to set up therapy. Patient completed 30 days, no readmissions with goals met. No further NN needs noted will close encounter. Goals Addressed      COMPLETED: Identification of barriers to adherence to a plan of care such as inability to afford medications, lack of insurance, lack of transportation, etc.                  12/14  Medications reviewed during office visit and reconciled. All meds available and taking as instructed. 12/21 can resume ASA in 1 week per neurosurgery.  COMPLETED: Prevent complications post hospitalization. 12/14  Fall prevention discuss with patient. Safe ambulation. Balance and fall info mailed to patient for review.  COMPLETED: Supportive resources in place to maintain patient in the community (ie. Home Health, DME equipment, refer to, medication assistant plan, etc.)                  encouraged patient to have [facility] for PT home safety eval.  12/21 agrees to contact office if further needs. Patient has strong family support.  COMPLETED: Understands red flags post discharge. 11/24   Discussed red flags related to [facility]. Fall prevention. 12/14  Patient is able to review red flags related to [facility].

## 2017-12-26 RX ORDER — ASPIRIN 81 MG/1
81 TABLET ORAL DAILY
COMMUNITY

## 2017-12-26 NOTE — PROGRESS NOTES
12/26  Received notes from neurosurgery. Patient will resume aspirin. No further follow up at this time. Patient has completed 30 day transition of care. Attended follow up appointment. No hospital readmissions and Goals met. No other needs identified to Nurse Navigator at this time. Resolving episode.

## 2018-04-16 ENCOUNTER — TELEPHONE (OUTPATIENT)
Dept: INTERNAL MEDICINE CLINIC | Age: 83
End: 2018-04-16

## 2018-05-06 RX ORDER — INSULIN DETEMIR 100 [IU]/ML
INJECTION, SOLUTION SUBCUTANEOUS
Qty: 15 PEN | Refills: 3 | Status: SHIPPED | OUTPATIENT
Start: 2018-05-06 | End: 2018-05-21 | Stop reason: SDUPTHER

## 2018-05-21 ENCOUNTER — OFFICE VISIT (OUTPATIENT)
Dept: INTERNAL MEDICINE CLINIC | Age: 83
End: 2018-05-21

## 2018-05-21 VITALS
OXYGEN SATURATION: 96 % | HEART RATE: 58 BPM | HEIGHT: 61 IN | SYSTOLIC BLOOD PRESSURE: 150 MMHG | TEMPERATURE: 97.4 F | BODY MASS INDEX: 27.19 KG/M2 | RESPIRATION RATE: 16 BRPM | WEIGHT: 144 LBS | DIASTOLIC BLOOD PRESSURE: 70 MMHG

## 2018-05-21 DIAGNOSIS — E78.00 HYPERCHOLESTEROLEMIA: ICD-10-CM

## 2018-05-21 DIAGNOSIS — Z13.39 SCREENING FOR ALCOHOLISM: ICD-10-CM

## 2018-05-21 DIAGNOSIS — Z13.31 SCREENING FOR DEPRESSION: ICD-10-CM

## 2018-05-21 DIAGNOSIS — Z00.00 MEDICARE ANNUAL WELLNESS VISIT, SUBSEQUENT: ICD-10-CM

## 2018-05-21 DIAGNOSIS — Z79.4 TYPE 2 DIABETES MELLITUS WITH OTHER CIRCULATORY COMPLICATION, WITH LONG-TERM CURRENT USE OF INSULIN (HCC): Primary | ICD-10-CM

## 2018-05-21 DIAGNOSIS — E11.59 TYPE 2 DIABETES MELLITUS WITH OTHER CIRCULATORY COMPLICATION, WITH LONG-TERM CURRENT USE OF INSULIN (HCC): Primary | ICD-10-CM

## 2018-05-21 DIAGNOSIS — I10 HTN (HYPERTENSION), BENIGN: ICD-10-CM

## 2018-05-21 PROBLEM — E11.40 TYPE 2 DIABETES MELLITUS WITH DIABETIC NEUROPATHY (HCC): Status: ACTIVE | Noted: 2018-05-21

## 2018-05-21 LAB — HBA1C MFR BLD HPLC: 6.8 %

## 2018-05-21 NOTE — PROGRESS NOTES
Coordination of Care Questions    1. Have you been to the ER, urgent care clinic since your last visit? No       Hospitalized since your last visit? No    2. Have you seen or consulted any other health care providers outside of the [address] [place] [address] since your last visit? Include any pap smears or colon screening.  No

## 2018-05-21 NOTE — PATIENT INSTRUCTIONS
Medicare Part B Preventive Services Limitations Recommendation Scheduled   Cardiovascular Screening Blood Tests     Last: 5/5/17    Every Year Ordered   Glaucoma Screening (no USPSTF recommendation) Diabetes mellitus, family history, , age 48 or over,  American, age 72 or over     Every year Due   Seasonal Influenza Vaccination (annually)    Every Fall Please get one this Fall. Shingles Vaccination  A shingrix vaccine series (2 shots  by 2-6 months) is recommended once in a lifetime after age 48  Please get one at your pharmacy once it is covered   Pneumococcal Vaccination (a Prevnar-13 and a Pneumovax after 65)  Last:  Pneumovax: 11/3/14    Prevnar-13: 11/1/16 Complete       Medicare Wellness Visit, Female    The best way to live healthy is to have a lifestyle where you eat a well-balanced diet, exercise regularly, limit alcohol use, and quit all forms of tobacco/nicotine, if applicable. Regular preventive services are another way to keep healthy. Preventive services (vaccines, screening tests, monitoring & exams) can help personalize your care plan, which helps you manage your own care. Screening tests can find health problems at the earliest stages, when they are easiest to treat. [facility] follows the current, evidence-based guidelines published by the [facility] (USPSTF) when recommending preventive services for our patients. Because we follow these guidelines, sometimes recommendations change over time as research supports it. (For example, mammograms used to be recommended annually. Even though Medicare will still pay for an annual mammogram, the newer guidelines recommend a mammogram every two years for women of average risk.)    Of course, you and your provider may decide to screen more often for some diseases, based on your risk and co-morbidities (chronic disease you are already diagnosed with).      Preventive services for you include:    - Medicare offers their members a free annual wellness visit, which is time for you and your primary care provider to discuss and plan for your preventive service needs. Take advantage of this benefit every year!    -All people over age 72 should receive the recommended pneumonia vaccines. Current USPSTF guidelines recommend a series of two vaccines for the best pneumonia protection.     -All adults should have a yearly flu vaccine and a tetanus vaccine every 10 years. All adults age 61 years should receive a shingles vaccine once in their lifetime.      -A bone mass density test is recommended when a woman turns 65 to screen for osteoporosis. This test is only recommended once as a screening. Some providers will use this same test as a disease monitoring tool if you already have osteoporosis. -All adults age 38-68 years who are overweight should have a diabetes screening test once every three years.     -Other screening tests & preventive services for persons with diabetes include: an eye exam to screen for diabetic retinopathy, a kidney function test, a foot exam, and stricter control over your cholesterol.     -Cardiovascular screening for adults with routine risk involves an electrocardiogram (ECG) at intervals determined by the provider.     -Colorectal cancer screenings should be done for adults age 54-65 years with normal risk. There are a number of acceptable methods of screening for this type of cancer. Each test has its own benefits and drawbacks. Discuss with your provider what is most appropriate for you during your annual wellness visit. The different tests include: colonoscopy (considered the best screening method), a fecal occult blood test, a fecal DNA test, and sigmoidoscopy.     -Breast cancer screenings are recommended every other year for women of normal risk age 54-69 years.     -Cervical cancer screenings for women over age 72 are only recommended with certain risk factors.     -All adults born between [facility] should be screened once for Hepatitis C.      Here is a list of your current Health Maintenance items (your personalized list of preventive services) with a due date:  Health Maintenance Due   Topic Date Due    Eye Exam  04/30/2014    Glaucoma Screening   04/30/2015    Hemoglobin A1C    02/17/2018    Diabetic Foot Care  05/05/2018    Albumin Urine Test  05/05/2018    Cholesterol Test   05/05/2018    Annual Well Visit  05/06/2018

## 2018-05-21 NOTE — MR AVS SNAPSHOT
[address] [place] [address] [address] [facility] 
[phone] Patient: Danitza Clark MRN:  [id number] Visit Information Date & Time Provider Department Dept. Phone Encounter #  
 5/21/2018  3:30 PM Karyle Lory, [address] [place] [address] [facility] [phone] [id number] Upcoming Health Maintenance Date Due  
 EYE EXAM RETINAL OR DILATED Q1 4/30/2014 GLAUCOMA SCREENING Q2Y 4/30/2015 HEMOGLOBIN A1C Q6M 2/17/2018 FOOT EXAM Q1 5/5/2018 MICROALBUMIN Q1 5/5/2018 LIPID PANEL Q1 5/5/2018 MEDICARE YEARLY EXAM 5/6/2018 Influenza Age 5 to Adult 8/1/2018 DTaP/Tdap/Td series (2 - Td) 11/22/2027 Allergies as of 5/21/2018  Review Complete On: 5/21/2018 By: Reji Spear PHARMD  
 No Known Allergies Current Immunizations  Reviewed on 5/21/2018 Name Date Influenza High Dose Vaccine PF 11/9/2015, 11/3/2014 Influenza Vaccine 9/12/2013 Influenza Vaccine (Quad) PF 11/23/2017  4:39 PM  
 Influenza Vaccine Split 9/25/2012, 9/27/2011, 12/20/2010 Pneumococcal Conjugate (PCV-13) 11/1/2016 Pneumococcal Vaccine (Unspecified Type) 11/3/2014 Tdap 11/22/2017 12:51 PM  
 ZZZ-RETIRED (DO NOT USE) Pneumococcal Vaccine (Unspecified Type) 1/2/2002 Zoster Vaccine, Live 9/15/2016 Reviewed by Reji Spear PHARMD on 5/21/2018 at  4:09 PM  
You Were Diagnosed With   
  
 Codes Comments Type 2 diabetes mellitus with other circulatory complication, with long-term current use of insulin (HCC)    -  Primary ICD-10-CM: E11.59, Z79.4 ICD-9-CM: 250.70, V58.67 Hypercholesterolemia     ICD-10-CM: E78.00 ICD-9-CM: 272.0   
 HTN (hypertension), benign     ICD-10-CM: I10 
ICD-9-CM: 401.1 Medicare annual wellness visit, subsequent     ICD-10-CM: Z00.00 ICD-9-CM: V70.0 Screening for alcoholism     ICD-10-CM: Z13.89 ICD-9-CM: V79.1  Screening for depression     ICD-10-CM: Z13.89 
 ICD-9-CM: V79.0 Vitals BP Pulse Temp Resp Height(growth percentile) Weight(growth percentile) 150/70 (!) 58 97.4 °F (36.3 °C) (Oral) 16 5' 1\" (1.549 m) 144 lb (65.3 kg) SpO2 BMI OB Status Smoking Status 96% 27.21 kg/m2 Hysterectomy Never Smoker Vitals History BMI and BSA Data Body Mass Index Body Surface Area  
 27.21 kg/m 2 1.68 m 2 Preferred Pharmacy Pharmacy Name Phone [address] [email or web address] [address] [phone] Your Updated Medication List  
  
   
This list is accurate as of 5/21/18  4:15 PM.  Always use your most recent med list.  
  
  
  
  
 aspirin delayed-release 81 mg tablet Take 81 mg by mouth daily. carvedilol 25 mg tablet Commonly known as:  COREG  
TAKE 1 TABLET TWICE DAILY WITH MEALS  
  
 felodipine 10 mg 24 hr tablet Commonly known as:  PLENDIL SR  
TAKE 1 TABLET EVERY DAY  
  
 gabapentin 100 mg capsule Commonly known as:  NEURONTIN Take 100 mg by mouth nightly. insulin detemir U-100 100 unit/mL (3 mL) Inpn Commonly known as:  LEVEMIR FLEXTOUCH U-100 INSULN  
45 Units by SubCUTAneous route nightly. losartan-hydroCHLOROthiazide 100-25 mg per tablet Commonly known as:  HYZAAR  
TAKE 1 TABLET EVERY DAY  
  
 metFORMIN 1,000 mg tablet Commonly known as:  GLUCOPHAGE Take 1,000 mg by mouth two (2) times daily (with meals). sertraline 25 mg tablet Commonly known as:  ZOLOFT  
TAKE 1 TABLET EVERY DAY  
  
 simvastatin 40 mg tablet Commonly known as:  ZOCOR  
TAKE 1 TABLET EVERY NIGHT  
  
 spironolactone 25 mg tablet Commonly known as:  ALDACTONE  
TAKE 1 TABLET EVERY DAY  
  
 VITAMIN D3 1,000 unit tablet Generic drug:  cholecalciferol Take 1,000 Units by mouth daily. We Performed the Following AMB POC HEMOGLOBIN A1C [14713 CPT(R)] CBC WITH AUTOMATED DIFF [88180 CPT(R)] Mitch 68 [[id number] [facility]]  DIABETES FOOT EXAM [7 Custom] LIPID PANEL [65924 CPT(R)] METABOLIC PANEL, COMPREHENSIVE [40359 CPT(R)] MICROALBUMIN, UR, RAND W/ MICROALB/CREAT RATIO [id number] CPT(R)] Patient Instructions Medicare Part B Preventive Services Limitations Recommendation Scheduled Cardiovascular Screening Blood Tests Last: 5/5/17 Every Year Ordered Glaucoma Screening (no USPSTF recommendation) Diabetes mellitus, family history, , age 48 or over,  American, age 72 or over Every year Due Seasonal Influenza Vaccination (annually) Every Fall Please get one this Fall. Shingles Vaccination  A shingrix vaccine series (2 shots  by 2-6 months) is recommended once in a lifetime after age 48  Please get one at your pharmacy once it is covered Pneumococcal Vaccination (a [id number]-72 and a Pneumovax after 65)  Last: 
Pneumovax: 11/3/14 Prevnar-13: 11/1/16 Complete Medicare Wellness Visit, Female The best way to live healthy is to have a lifestyle where you eat a well-balanced diet, exercise regularly, limit alcohol use, and quit all forms of tobacco/nicotine, if applicable. Regular preventive services are another way to keep healthy. Preventive services (vaccines, screening tests, monitoring & exams) can help personalize your care plan, which helps you manage your own care. Screening tests can find health problems at the earliest stages, when they are easiest to treat. [facility]  follows the current, evidence-based guidelines published by the [facility] (USPSTF) when recommending preventive services for our patients. Because we follow these guidelines, sometimes recommendations change over time as research supports it. (For example, mammograms used to be recommended annually.  Even though Medicare will still pay for an annual mammogram, the newer guidelines recommend a mammogram every two years for women of average risk.) Of course, you and your provider may decide to screen more often for some diseases, based on your risk and co-morbidities (chronic disease you are already diagnosed with). Preventive services for you include: - Medicare offers their members a free annual wellness visit, which is time for you and your primary care provider to discuss and plan for your preventive service needs. Take advantage of this benefit every year! 
 
-All people over age 72 should receive the recommended pneumonia vaccines. Current USPSTF guidelines recommend a series of two vaccines for the best pneumonia protection.  
 
-All adults should have a yearly flu vaccine and a tetanus vaccine every 10 years. All adults age 61 years should receive a shingles vaccine once in their lifetime.   
 
-A bone mass density test is recommended when a woman turns 65 to screen for osteoporosis. This test is only recommended once as a screening. Some providers will use this same test as a disease monitoring tool if you already have osteoporosis. -All adults age 38-68 years who are overweight should have a diabetes screening test once every three years.  
 
-Other screening tests & preventive services for persons with diabetes include: an eye exam to screen for diabetic retinopathy, a kidney function test, a foot exam, and stricter control over your cholesterol.  
 
-Cardiovascular screening for adults with routine risk involves an electrocardiogram (ECG) at intervals determined by the provider.  
 
-Colorectal cancer screenings should be done for adults age 54-65 years with normal risk. There are a number of acceptable methods of screening for this type of cancer. Each test has its own benefits and drawbacks. Discuss with your provider what is most appropriate for you during your annual wellness visit.  The different tests include: colonoscopy (considered the best screening method), a fecal occult blood test, a fecal DNA test, and sigmoidoscopy. -Breast cancer screenings are recommended every other year for women of normal risk age 54-69 years.  
 
-Cervical cancer screenings for women over age 72 are only recommended with certain risk factors.  
 
-All adults born between [facility] should be screened once for Hepatitis C. Here is a list of your current Health Maintenance items (your personalized list of preventive services) with a due date: 
Health Maintenance Due Topic Date Due Matthews Raúl Eye Exam  04/30/2014  Glaucoma Screening   04/30/2015  Hemoglobin A1C    02/17/2018 Byron Olsen Diabetic Foot Care  05/05/2018  Albumin Urine Test  05/05/2018  Cholesterol Test   05/05/2018 Matthews Raúl Annual Well Visit  05/06/2018 Introducing [facility]! [facility] introduces Ztail patient portal. Now you can access parts of your medical record, email your doctor's office, and request medication refills online. 1. In your internet browser, go to [email or web address] 2. Click on the First Time User? Click Here link in the Sign In box. You will see the New Member Sign Up page. 3. Enter your Ztail Access Code exactly as it appears below. You will not need to use this code after youve completed the sign-up process. If you do not sign up before the expiration date, you must request a new code. · Ztail Access Code: [email or web address] Expires: 8/19/2018  4:12 PM 
 
4. Enter the last four digits of your Social Security Number (xxxx) and Date of Birth (mm/dd/yyyy) as indicated and click Submit. You will be taken to the next sign-up page. 5. Create a Ztail ID. This will be your Ztail login ID and cannot be changed, so think of one that is secure and easy to remember. 6. Create a Ztail password. You can change your password at any time. 7. Enter your Password Reset Question and Answer.  This can be used at a later time if you forget your password. 8. Enter your e-mail address. You will receive e-mail notification when new information is available in [address] 9. Click Sign Up. You can now view and download portions of your medical record. 10. Click the Download Summary menu link to download a portable copy of your medical information. If you have questions, please visit the Frequently Asked Questions section of the ilustrum website. Remember, ilustrum is NOT to be used for urgent needs. For medical emergencies, dial 911. Now available from your iPhone and Android! Please provide this summary of care documentation to your next provider. Your primary care clinician is listed as Bryant Dolan. If you have any questions after today's visit, please call [phone].

## 2018-05-21 NOTE — PROGRESS NOTES
SUBJECTIVE: Eli Ruiz is a 80 y.o. female seen for a follow up visit; she has diabetes, hypertension, hyperlipidemia, coronary artery disease, history of prior MI, Systolic HF and status post CABG. Current Outpatient Prescriptions   Medication Sig Dispense Refill    aspirin delayed-release 81 mg tablet Take  by mouth daily.  metFORMIN (GLUCOPHAGE) 1,000 mg tablet Take 1,000 mg by mouth two (2) times daily (with meals).  glipiZIDE SR (GLUCOTROL XL) 2.5 mg CR tablet TAKE 1 TABLET EVERY DAY (NEW DOSE) 90 Tab 1    losartan-hydroCHLOROthiazide (HYZAAR) 100-25 mg per tablet TAKE 1 TABLET EVERY DAY 90 Tab 2    felodipine (PLENDIL SR) 10 mg 24 hr tablet TAKE 1 TABLET EVERY DAY 90 Tab 1    gabapentin (NEURONTIN) 100 mg capsule Take 100 mg by mouth nightly.  simvastatin (ZOCOR) 40 mg tablet TAKE 1 TABLET EVERY NIGHT 90 Tab 1    sertraline (ZOLOFT) 25 mg tablet TAKE 1 TABLET EVERY DAY 90 Tab 1    spironolactone (ALDACTONE) 25 mg tablet TAKE 1 TABLET EVERY DAY 90 Tab 1    insulin detemir (LEVEMIR FLEXTOUCH) 100 unit/mL (3 mL) inpn 45 Units by SubCUTAneous route nightly. 15 Pen 1    carvedilol (COREG) 25 mg tablet TAKE 1 TABLET TWICE DAILY WITH MEALS 180 Tab 3    cholecalciferol (VITAMIN D3) 1,000 unit tablet Take 1,000 Units by mouth daily.        Patient Active Problem List   Diagnosis Code    CAD (coronary artery disease), native coronary artery I25.10    REYMUNDO (obstructive sleep apnea) G47.33    Hypercholesterolemia E78.00    Diabetes ([facility] E11.9    Vitamin D deficiency E55.9    Diabetes mellitus with renal manifestations, uncontrolled ([facility] E11.29, E11.65    Type 2 diabetes mellitus with circulatory disorder ([facility]) E11.59    Mobility impaired Z74.09    Coronary artery disease involving native coronary artery of native heart without angina pectoris I25.10    Urinary incontinence without sensory awareness N39.42    Type 2 diabetes mellitus with circulatory disorder, with long-term current use of insulin ([facility]) E11.59, Z79.4    HTN (hypertension), benign I10    Advanced directives, counseling/discussion Z71.89    SAH (subarachnoid hemorrhage) ([facility]) I60.9    Fall W19. Rafaela Gagnon Hypomagnesemia E83.42     System Review: Cardiovascular ROS - taking medications as instructed, no medication side effects noted, patient does not perform home BP monitoring, no TIA's, no chest pain on exertion, no dyspnea on exertion, no swelling of ankles, Diabetic ROS - medication compliance: compliant all of the time, diabetic diet compliance: noncompliant some of the time, home glucose monitoring: is not performed, CNS ROS - no TIA or stroke-like symptoms, no amaurosis, diplopia, abnormal speech, unilateral numbness or weakness. New concerns: .has not seen cardiology. OBJECTIVE:  Visit Vitals    /79 (BP 1 Location: Left arm, BP Patient Position: Sitting)    Pulse (!) 58    Temp 97.4 °F (36.3 °C) (Oral)    Resp 16    Ht 5' 1\" (1.549 m)    Wt 144 lb (65.3 kg)    SpO2 96%    BMI 27.21 kg/m2      Vitals:    05/21/18 1532 05/21/18 1547   BP: 176/79 150/70   Pulse: (!) 58    Resp: 16    Temp: 97.4 °F (36.3 °C)    TempSrc: Oral    SpO2: 96%    Weight: 144 lb (65.3 kg)    Height: 5' 1\" (1.549 m)      BP Readings from Last 3 Encounters:   05/21/18 150/70   12/04/17 155/70   11/23/17 132/48       Appearance: alert, well appearing, and in no distress and oriented to person, place, and time. General exam: CVS exam BP noted to be borderline elevated today in office, S1, S2 normal, no gallop, no murmur, chest clear, no JVD, no HSM, no edema, neurological exam alert, oriented, normal speech, no focal findings or movement disorder noted. Lab review: labs reviewed, I note that glycosylated hemoglobin therapeutic, orders written for new lab studies as appropriate; see orders. ASSESSMENT:  diabetes stable, hypertension borderline controlled, coronary artery disease asymptomatic.     PLAN:  current treatment plan is effective, no change in therapy  the following changes are made - stop glipizide  due to risk hypoglycemia. Fasting labs    1. Type 2 diabetes mellitus with other circulatory complication, with long-term current use of insulin ([facility]  Results for orders placed or performed in visit on 05/21/18   AMB POC HEMOGLOBIN A1C   Result Value Ref Range    Hemoglobin A1c (POC) 6.8 %       - AMB POC HEMOGLOBIN A1C  - CBC WITH AUTOMATED DIFF  - LIPID PANEL  - METABOLIC PANEL, COMPREHENSIVE  - MICROALBUMIN, UR, RAND W/ MICROALB/CREAT RATIO  -  DIABETES FOOT EXAM    2. Hypercholesterolemia  labs    3. HTN (hypertension), benign  controlled    4. Medicare annual wellness visit, subsequent  diet    5. Screening for alcoholism  none    6.  Screening for depression    - Depression Screen Annual

## 2018-07-05 RX ORDER — FELODIPINE 10 MG/1
TABLET, EXTENDED RELEASE ORAL
Qty: 90 TAB | Refills: 1 | Status: SHIPPED | OUTPATIENT
Start: 2018-07-05 | End: 2018-11-19 | Stop reason: SDUPTHER

## 2018-07-19 ENCOUNTER — HOSPITAL ENCOUNTER (OUTPATIENT)
Dept: LAB | Age: 83
Discharge: HOME | End: 2018-07-19
Payer: MEDICARE

## 2018-07-19 PROCEDURE — 82043 UR ALBUMIN QUANTITATIVE: CPT

## 2018-07-19 PROCEDURE — 36415 COLL VENOUS BLD VENIPUNCTURE: CPT

## 2018-07-19 PROCEDURE — 80061 LIPID PANEL: CPT

## 2018-07-19 PROCEDURE — 85025 COMPLETE CBC W/AUTO DIFF WBC: CPT

## 2018-07-19 PROCEDURE — 80053 COMPREHEN METABOLIC PANEL: CPT

## 2018-07-21 LAB
ALBUMIN SERPL-MCNC: 4.3 G/DL (ref 3.5–4.7)
ALBUMIN/CREAT UR: 1668.5 MG/G CREAT (ref 0–30)
ALBUMIN/GLOB SERPL: 1.5 {RATIO} (ref 1.2–2.2)
ALP SERPL-CCNC: 44 IU/L (ref 39–117)
ALT SERPL-CCNC: 13 IU/L (ref 0–32)
AST SERPL-CCNC: 17 IU/L (ref 0–40)
BASOPHILS # BLD AUTO: 0 X10E3/UL (ref 0–0.2)
BASOPHILS NFR BLD AUTO: 1 %
BILIRUB SERPL-MCNC: 0.3 MG/DL (ref 0–1.2)
BUN SERPL-MCNC: 24 MG/DL (ref 8–27)
BUN/CREAT SERPL: 31 (ref 12–28)
CALCIUM SERPL-MCNC: 10.8 MG/DL (ref 8.7–10.3)
CHLORIDE SERPL-SCNC: 108 MMOL/L (ref 96–106)
CHOLEST SERPL-MCNC: 165 MG/DL (ref 100–199)
CO2 SERPL-SCNC: 19 MMOL/L (ref 20–29)
CREAT SERPL-MCNC: 0.78 MG/DL (ref 0.57–1)
CREAT UR-MCNC: 40 MG/DL
EOSINOPHIL # BLD AUTO: 0.3 X10E3/UL (ref 0–0.4)
EOSINOPHIL NFR BLD AUTO: 4 %
ERYTHROCYTE [DISTWIDTH] IN BLOOD BY AUTOMATED COUNT: 13.5 % (ref 12.3–15.4)
GLOBULIN SER CALC-MCNC: 2.8 G/DL (ref 1.5–4.5)
GLUCOSE SERPL-MCNC: 135 MG/DL (ref 65–99)
HCT VFR BLD AUTO: 36.3 % (ref 34–46.6)
HDLC SERPL-MCNC: 47 MG/DL
HGB BLD-MCNC: 11.5 G/DL (ref 11.1–15.9)
IMM GRANULOCYTES # BLD: 0 X10E3/UL (ref 0–0.1)
IMM GRANULOCYTES NFR BLD: 0 %
INTERPRETATION, 910389: NORMAL
LDLC SERPL CALC-MCNC: 90 MG/DL (ref 0–99)
LYMPHOCYTES # BLD AUTO: 2.7 X10E3/UL (ref 0.7–3.1)
LYMPHOCYTES NFR BLD AUTO: 36 %
Lab: NORMAL
MCH RBC QN AUTO: 31.6 PG (ref 26.6–33)
MCHC RBC AUTO-ENTMCNC: 31.7 G/DL (ref 31.5–35.7)
MCV RBC AUTO: 100 FL (ref 79–97)
MICROALBUMIN UR-MCNC: 667.4 UG/ML
MONOCYTES # BLD AUTO: 0.7 X10E3/UL (ref 0.1–0.9)
MONOCYTES NFR BLD AUTO: 9 %
NEUTROPHILS # BLD AUTO: 3.9 X10E3/UL (ref 1.4–7)
NEUTROPHILS NFR BLD AUTO: 50 %
PLATELET # BLD AUTO: 315 X10E3/UL (ref 150–379)
POTASSIUM SERPL-SCNC: 5.3 MMOL/L (ref 3.5–5.2)
PROT SERPL-MCNC: 7.1 G/DL (ref 6–8.5)
RBC # BLD AUTO: 3.64 X10E6/UL (ref 3.77–5.28)
SODIUM SERPL-SCNC: 143 MMOL/L (ref 134–144)
TRIGL SERPL-MCNC: 142 MG/DL (ref 0–149)
VLDLC SERPL CALC-MCNC: 28 MG/DL (ref 5–40)
WBC # BLD AUTO: 7.6 X10E3/UL (ref 3.4–10.8)

## 2018-07-22 RX ORDER — SIMVASTATIN 40 MG/1
TABLET, FILM COATED ORAL
Qty: 90 TAB | Refills: 1 | Status: SHIPPED | OUTPATIENT
Start: 2018-07-22 | End: 2018-12-03 | Stop reason: SDUPTHER

## 2018-08-05 RX ORDER — SERTRALINE HYDROCHLORIDE 25 MG/1
TABLET, FILM COATED ORAL
Qty: 90 TAB | Refills: 1 | Status: SHIPPED | OUTPATIENT
Start: 2018-08-05 | End: 2018-12-19 | Stop reason: SDUPTHER

## 2018-08-05 RX ORDER — CARVEDILOL 25 MG/1
TABLET ORAL
Qty: 180 TAB | Refills: 3 | Status: SHIPPED | OUTPATIENT
Start: 2018-08-05 | End: 2019-06-03 | Stop reason: SDUPTHER

## 2018-08-08 RX ORDER — METFORMIN HYDROCHLORIDE 1000 MG/1
TABLET ORAL
Qty: 180 TAB | Refills: 3 | Status: SHIPPED | OUTPATIENT
Start: 2018-08-08 | End: 2019-08-13 | Stop reason: SDUPTHER

## 2018-08-27 ENCOUNTER — OFFICE VISIT (OUTPATIENT)
Dept: INTERNAL MEDICINE CLINIC | Age: 83
End: 2018-08-27

## 2018-08-27 VITALS
OXYGEN SATURATION: 98 % | SYSTOLIC BLOOD PRESSURE: 160 MMHG | DIASTOLIC BLOOD PRESSURE: 76 MMHG | TEMPERATURE: 95.9 F | HEART RATE: 61 BPM | HEIGHT: 61 IN | RESPIRATION RATE: 16 BRPM | BODY MASS INDEX: 27.34 KG/M2 | WEIGHT: 144.8 LBS

## 2018-08-27 DIAGNOSIS — E11.22 UNCONTROLLED TYPE 2 DIABETES MELLITUS WITH CHRONIC KIDNEY DISEASE, WITH LONG-TERM CURRENT USE OF INSULIN, UNSPECIFIED CKD STAGE: Primary | ICD-10-CM

## 2018-08-27 DIAGNOSIS — E11.65 UNCONTROLLED TYPE 2 DIABETES MELLITUS WITH CHRONIC KIDNEY DISEASE, WITH LONG-TERM CURRENT USE OF INSULIN, UNSPECIFIED CKD STAGE: Primary | ICD-10-CM

## 2018-08-27 DIAGNOSIS — R06.02 SHORTNESS OF BREATH: ICD-10-CM

## 2018-08-27 DIAGNOSIS — Z79.4 UNCONTROLLED TYPE 2 DIABETES MELLITUS WITH CHRONIC KIDNEY DISEASE, WITH LONG-TERM CURRENT USE OF INSULIN, UNSPECIFIED CKD STAGE: Primary | ICD-10-CM

## 2018-08-27 PROBLEM — R80.9 MICROALBUMINURIA: Status: ACTIVE | Noted: 2018-08-27

## 2018-08-27 LAB — HBA1C MFR BLD HPLC: 8 %

## 2018-08-27 NOTE — MR AVS SNAPSHOT
[address] [place] [address] [address] [facility] 
[phone] Patient: Lee Rebolledo MRN:  [id number] Visit Information Date & Time Provider Department Dept. Phone Encounter #  
 8/27/2018  3:30 PM Carlo Palmer, [address] [place] [address] [facility] [phone] [id number] Upcoming Health Maintenance Date Due  
 EYE EXAM RETINAL OR DILATED Q1 4/30/2014 GLAUCOMA SCREENING Q2Y 4/30/2015 Influenza Age 5 to Adult 8/1/2018 HEMOGLOBIN A1C Q6M 11/21/2018 FOOT EXAM Q1 5/21/2019 MEDICARE YEARLY EXAM 5/22/2019 MICROALBUMIN Q1 7/19/2019 LIPID PANEL Q1 7/19/2019 DTaP/Tdap/Td series (2 - Td) 11/22/2027 Allergies as of 8/27/2018  Review Complete On: 8/27/2018 By: Manjula Casillas No Known Allergies Current Immunizations  Reviewed on 5/21/2018 Name Date Influenza High Dose Vaccine PF 11/9/2015, 11/3/2014 Influenza Vaccine 9/12/2013 Influenza Vaccine (Quad) PF 11/23/2017  4:39 PM  
 Influenza Vaccine Split 9/25/2012, 9/27/2011, 12/20/2010 Pneumococcal Conjugate (PCV-13) 11/1/2016 Pneumococcal Vaccine (Unspecified Type) 11/3/2014 Tdap 11/22/2017 12:51 PM  
 ZZZ-RETIRED (DO NOT USE) Pneumococcal Vaccine (Unspecified Type) 1/2/2002 Zoster Vaccine, Live 9/15/2016 Not reviewed this visit You Were Diagnosed With   
  
 Codes Comments Uncontrolled type 2 diabetes mellitus with chronic kidney disease, with long-term current use of insulin, unspecified CKD stage ([facility]    -  Primary ICD-10-CM: E11.22, Z79.4, E11.65 ICD-9-CM: 250.42, 585.9, V58.67 Shortness of breath     ICD-10-CM: R06.02 
ICD-9-CM: 786.05 Vitals BP Pulse Temp Resp Height(growth percentile) Weight(growth percentile) 160/76 61 95.9 °F (35.5 °C) (Oral) 16 5' 1\" (1.549 m) 144 lb 12.8 oz (65.7 kg) SpO2 BMI OB Status Smoking Status 98% 27.36 kg/m2 Hysterectomy Never Smoker Vitals History BMI and BSA Data Body Mass Index Body Surface Area  
 27.36 kg/m 2 1.68 m 2 Preferred Pharmacy Pharmacy Name Phone [address] [phone] Your Updated Medication List  
  
   
This list is accurate as of 8/27/18  4:24 PM.  Always use your most recent med list.  
  
  
  
  
 aspirin delayed-release 81 mg tablet Take 81 mg by mouth daily. carvedilol 25 mg tablet Commonly known as:  COREG  
TAKE 1 TABLET TWICE DAILY WITH MEALS  
  
 felodipine 10 mg 24 hr tablet Commonly known as:  PLENDIL SR  
TAKE 1 TABLET EVERY DAY  
  
 flash glucose scanning reader Misc Commonly known as:  FREESTYLE RICK READER Use as directed  
  
 flash glucose sensor Kit Commonly known as:  FREESTYLE RICK SENSOR  
1 Each by Does Not Apply route daily. gabapentin 100 mg capsule Commonly known as:  NEURONTIN Take 100 mg by mouth nightly. insulin detemir U-100 100 unit/mL (3 mL) Inpn Commonly known as:  LEVEMIR FLEXTOUCH U-100 INSULN  
50 Units by SubCUTAneous route nightly. Indications: type 2 diabetes mellitus  
  
 losartan-hydroCHLOROthiazide 100-25 mg per tablet Commonly known as:  HYZAAR  
TAKE 1 TABLET EVERY DAY  
  
 metFORMIN 1,000 mg tablet Commonly known as:  GLUCOPHAGE  
TAKE 1 TABLET TWICE DAILY (WITH MEALS) AS DIRECTED  
  
 sertraline 25 mg tablet Commonly known as:  ZOLOFT  
TAKE 1 TABLET EVERY DAY  
  
 simvastatin 40 mg tablet Commonly known as:  ZOCOR  
TAKE 1 TABLET EVERY NIGHT  
  
 spironolactone 25 mg tablet Commonly known as:  ALDACTONE  
TAKE 1 TABLET EVERY DAY  
  
 VITAMIN D3 1,000 unit tablet Generic drug:  cholecalciferol Take 1,000 Units by mouth daily. Prescriptions Sent to Pharmacy Refills  
 flash glucose scanning reader (FREESTYLE RICK READER) [facility] Sig: Use as directed  Class: Normal  
 Pharmacy: [facility] [address], [address] Ph #: [phone]  
 flash glucose sensor (FREESTYLE RICK SENSOR) kit 11 Si Each by Does Not Apply route daily. Class: Normal  
 Pharmacy: [facility] [address], [address] Ph #: [phone] Route: Does Not Apply  
 insulin detemir U-100 (LEVEMIR FLEXTOUCH U-100 INSULN) 100 unit/mL (3 mL) inpn 1 Si Units by SubCUTAneous route nightly. Indications: type 2 diabetes mellitus Class: Normal  
 Pharmacy: [facility] [address], [address] Ph #: [phone] Route: SubCUTAneous We Performed the Following AMB POC HEMOGLOBIN A1C [50946 CPT(R)] To-Do List   
 2018 ECHO:  2D ECHO COMPLETE ADULT (TTE) W OR WO CONTR Introducing [facility]! Polly Yanez introduces Flat.to patient portal. Now you can access parts of your medical record, email your doctor's office, and request medication refills online. 1. In your internet browser, go to [email or web address] 2. Click on the First Time User? Click Here link in the Sign In box. You will see the New Member Sign Up page. 3. Enter your Flat.to Access Code exactly as it appears below. You will not need to use this code after youve completed the sign-up process. If you do not sign up before the expiration date, you must request a new code. · Flat.to Access Code: [email or web address] Expires: 2018  4:24 PM 
 
4. Enter the last four digits of your Social Security Number (xxxx) and Date of Birth (mm/dd/yyyy) as indicated and click Submit. You will be taken to the next sign-up page. 5. Create a Enertivt ID. This will be your Flat.to login ID and cannot be changed, so think of one that is secure and easy to remember. 6. Create a Flat.to password. You can change your password at any time. 7. Enter your Password Reset Question and Answer.  This can be used at a later time if you forget your password. 8. Enter your e-mail address. You will receive e-mail notification when new information is available in [address] 9. Click Sign Up. You can now view and download portions of your medical record. 10. Click the Download Summary menu link to download a portable copy of your medical information. If you have questions, please visit the Frequently Asked Questions section of the [employer] website. Remember, [employer] is NOT to be used for urgent needs. For medical emergencies, dial 911. Now available from your iPhone and Android! Please provide this summary of care documentation to your next provider. Your primary care clinician is listed as Lynnwood Gitelman. If you have any questions after today's visit, please call [phone].

## 2018-08-27 NOTE — PROGRESS NOTES
Elan Zhu is a 80 y.o. female      Chief Complaint   Patient presents with    Follow-up    Foot Swelling     Bilateral Foot Swelling    Dizziness    Diarrhea     Loose Stool. 1. Have you been to the ER, urgent care clinic since your last visit? Hospitalized since your last visit? No    2. Have you seen or consulted any other health care providers outside of the [address] [place] [address] since your last visit? Include any pap smears or colon screening.  No

## 2018-08-27 NOTE — PROGRESS NOTES
Chief Complaint   Patient presents with    Follow-up    Foot Swelling     Bilateral Foot Swelling    Dizziness    Diarrhea     Loose Stool. Subjective:  She has longstanding coronary disease, diabetes, using insulin, not checking sugars. Has had 1-2 spells of either low sugar or low blood pressure, but not clear. She's compliant with her meds. Denies PND, orthopnea or flash pulmonary edema, but has had some peripheral edema and has had more shortness of breath with exertion. Significant in recent lab work is that diabetes control is not as good and she's developed rather significant urine microalbuminuria. She's had leg edema that doesn't seem to resolve. Physical Examination:  BP was as noted. Lungs were clear. She had a regular heart rhythm with an S4. Abdomen soft. Bilateral 1+ pedal edema. Plan:  I reviewed her labs. I recommended echocardiogram because of her history of coronary disease and lack of cardiology follow up. I have recommended low sodium diet. No big change in her meds. Could consider adding a stronger diuretic. Referral to renal, referral to cardiology eventually will be needed. She will continue her Spironolactone and present diuretic, which is Chlorthalidone. Diabetes control not perfect. Increase the bedtime insulin to 50. Follow up with me in 90 days. I have ordered her home glucose monitoring kit where she can do it without fingersticks.     Vitals:    08/27/18 1550 08/27/18 1609   BP: 144/57 160/76   Pulse: 61    Resp: 16    Temp: 95.9 °F (35.5 °C)    TempSrc: Oral    SpO2: 98%    Weight: 144 lb 12.8 oz (65.7 kg)    Height: 5' 1\" (1.549 m)      Patient Active Problem List    Diagnosis    Microalbuminuria    Type 2 diabetes mellitus with diabetic neuropathy (HCC)    Hypomagnesemia    SAH (subarachnoid hemorrhage) (HCC)    Fall    Advanced directives, counseling/discussion    Type 2 diabetes mellitus with circulatory disorder, with long-term current use of insulin ([facility]    HTN (hypertension), benign    Coronary artery disease involving native coronary artery of native heart without angina pectoris    Urinary incontinence without sensory awareness    Type 2 diabetes mellitus with circulatory disorder ([facility]    Mobility impaired     Use cane and as needed wheelchair and walker          Vitamin D deficiency    Diabetes mellitus with renal manifestations, uncontrolled ([facility]    Diabetes ([facility]    CAD (coronary artery disease), native coronary artery    Hypercholesterolemia    REYMUNDO (obstructive sleep apnea)     Not using CPAP             Diagnoses and all orders for this visit:    1. Uncontrolled type 2 diabetes mellitus with chronic kidney disease, with long-term current use of insulin, unspecified CKD stage (HCC)  -     AMB POC HEMOGLOBIN A1C  -     flash glucose scanning reader (FREESTYLE RICK READER) misc; Use as directed  -     flash glucose sensor (FREESTYLE RICK SENSOR) kit; 1 Each by Does Not Apply route daily. 2. Shortness of breath  -     2D ECHO COMPLETE ADULT (TTE) W OR WO CONTR; Future    Other orders  -     insulin detemir U-100 (LEVEMIR FLEXTOUCH U-100 INSULN) 100 unit/mL (3 mL) inpn; 50 Units by SubCUTAneous route nightly.  Indications: type 2 diabetes mellitus      Prolonged visit with 15 minutes of time out  of more than a  25 minute visit spent counseling patient and family and formulation of care

## 2018-09-17 RX ORDER — LOSARTAN POTASSIUM AND HYDROCHLOROTHIAZIDE 25; 100 MG/1; MG/1
TABLET ORAL
Qty: 90 TAB | Refills: 2 | Status: SHIPPED | OUTPATIENT
Start: 2018-09-17 | End: 2019-05-21 | Stop reason: SDUPTHER

## 2018-10-05 ENCOUNTER — PATIENT OUTREACH (OUTPATIENT)
Dept: INTERNAL MEDICINE CLINIC | Age: 83
End: 2018-10-05

## 2018-10-05 NOTE — PROGRESS NOTES
Patient has been identified as having a HgbA1C above goal. Contacted patient to schedule appointment with Dr. Roselyn Kramer and the diabetes interdisciplinary care team. She is interested in seeing the team, but lives in [place] and will discuss this with her daughter since she relies on her for transportation to her appointments. Advised her that her appt with Dr. Germaine Rojo can be coordinated with her appt with Dr. Roselyn Kramer and she verbalized understanding. She states a plan to call this NN back. Provided her with direct contact number.

## 2018-10-26 RX ORDER — GABAPENTIN 100 MG/1
CAPSULE ORAL
Qty: 180 CAP | Refills: 3 | Status: SHIPPED | OUTPATIENT
Start: 2018-10-26

## 2018-10-26 RX ORDER — SPIRONOLACTONE 25 MG/1
TABLET ORAL
Qty: 90 TAB | Refills: 1 | Status: SHIPPED | OUTPATIENT
Start: 2018-10-26 | End: 2019-05-22 | Stop reason: ALTCHOICE

## 2018-11-20 RX ORDER — FELODIPINE 10 MG/1
TABLET, EXTENDED RELEASE ORAL
Qty: 90 TAB | Refills: 1 | Status: SHIPPED | OUTPATIENT
Start: 2018-11-20 | End: 2019-04-08 | Stop reason: SDUPTHER

## 2018-11-27 ENCOUNTER — OFFICE VISIT (OUTPATIENT)
Dept: INTERNAL MEDICINE CLINIC | Age: 83
End: 2018-11-27

## 2018-11-27 VITALS
HEART RATE: 70 BPM | DIASTOLIC BLOOD PRESSURE: 70 MMHG | TEMPERATURE: 96.8 F | BODY MASS INDEX: 27.19 KG/M2 | SYSTOLIC BLOOD PRESSURE: 160 MMHG | HEIGHT: 61 IN | OXYGEN SATURATION: 97 % | WEIGHT: 144 LBS | RESPIRATION RATE: 24 BRPM

## 2018-11-27 DIAGNOSIS — R06.09 DYSPNEA ON EXERTION: ICD-10-CM

## 2018-11-27 DIAGNOSIS — Z79.4 TYPE 2 DIABETES MELLITUS WITH CHRONIC KIDNEY DISEASE, WITH LONG-TERM CURRENT USE OF INSULIN, UNSPECIFIED CKD STAGE (HCC): Primary | ICD-10-CM

## 2018-11-27 DIAGNOSIS — I25.10 CORONARY ARTERY DISEASE INVOLVING NATIVE CORONARY ARTERY OF NATIVE HEART WITHOUT ANGINA PECTORIS: ICD-10-CM

## 2018-11-27 DIAGNOSIS — I10 HTN (HYPERTENSION), BENIGN: ICD-10-CM

## 2018-11-27 DIAGNOSIS — E11.22 TYPE 2 DIABETES MELLITUS WITH CHRONIC KIDNEY DISEASE, WITH LONG-TERM CURRENT USE OF INSULIN, UNSPECIFIED CKD STAGE (HCC): Primary | ICD-10-CM

## 2018-11-27 DIAGNOSIS — R80.1 PERSISTENT PROTEINURIA: ICD-10-CM

## 2018-11-27 NOTE — PROGRESS NOTES
Chief Complaint Patient presents with  Diabetes  Coronary Artery Disease  Hypertension  Sleep Apnea  Cholesterol Problem  Vitamin D Deficiency Subjective:  She has longstanding coronary disease, diabetes, using insulin, is checking sugars. Has had 1-2 spells of either low sugar or low blood pressure, but not clear. Now doing   Blood sugar testing  She's compliant with her meds. Denies PND, orthopnea or flash pulmonary edema, but has had some peripheral edema and has had more shortness of breath with exertion. Significant in recent lab work is that diabetes control is not as good and she's developed rather significant urine microalbuminuria. She's had leg edema that doesn't seem to resolve. Saw card in [place] a week ago no changes  And will follow with him every 6 month Vitals:  
 11/27/18 1415 11/27/18 1434 BP: 191/74 160/70 Pulse: 70 Resp: 24 Temp: 96.8 °F (36 °C) TempSrc: Oral   
SpO2: 97% Weight: 144 lb (65.3 kg) Height: 5' 1\" (1.549 m) No meds  Today  So BP high Physical Examination:  BP was as noted. Lungs were clear. She had a regular heart rhythm with an S4. Abdomen soft. Bilateral 1+ pedal edema. Plan:  I reviewed her labs. Echo was done in [place] and EF  50 %   I have recommended low sodium diet. No big change in her meds. Could consider adding a stronger diuretic. Referral to renal, referral to cardiology eventually will be needed. She will continue her Spironolactone and present diuretic, which is Chlorthalidone. Diabetes control not perfect. Increase the bedtime insulin to 50. Follow up with me in 90 days. I have ordered her home glucose monitoring kit where she can do it without fingersticks. Has been checking and gets      Random OK results Vitals:  
 11/27/18 1415 11/27/18 1434 BP: 191/74 160/70 Pulse: 70 Resp: 24 Temp: 96.8 °F (36 °C) TempSrc: Oral   
SpO2: 97% Weight: 144 lb (65.3 kg) Height: 5' 1\" (1.549 m) Patient Active Problem List  
 Diagnosis  Microalbuminuria  Type 2 diabetes mellitus with diabetic neuropathy (HCC)  Hypomagnesemia  SAH (subarachnoid hemorrhage) ([address]  Fall  Advanced directives, counseling/discussion  Type 2 diabetes mellitus with circulatory disorder, with long-term current use of insulin ([address]  HTN (hypertension), benign  Coronary artery disease involving native coronary artery of native heart without angina pectoris Saw cardiology in [address] from [address]  Urinary incontinence without sensory awareness  Type 2 diabetes mellitus with circulatory disorder ([address]  Mobility impaired Use cane and as needed wheelchair and walker  Vitamin D deficiency  Diabetes mellitus with renal manifestations, uncontrolled ([address]  Diabetes ([address]  CAD (coronary artery disease), native coronary artery  Hypercholesterolemia  REYMUNDO (obstructive sleep apnea) Not using CPAP Diagnoses and all orders for this visit: 
 
1. Type 2 diabetes mellitus with chronic kidney disease, with long-term current use of insulin, unspecified CKD stage ([address] -     MICROALBUMIN, UR, RAND W/ MICROALB/CREAT RATIO 
-     METABOLIC PANEL, COMPREHENSIVE 
-     HEMOGLOBIN A1C W/O EAG 2. Coronary artery disease involving native coronary artery of native heart without angina pectoris -     CBC WITH AUTOMATED DIFF 
-     LIPID PANEL 3. HTN (hypertension), benign 4. Dyspnea on exertion 5. Persistent proteinuria 1. Coronary artery disease involving native coronary artery of native heart without angina pectoris Follow potassium 
- CBC WITH AUTOMATED DIFF 
- LIPID PANEL 2. Type 2 diabetes mellitus with chronic kidney disease, with long-term current use of insulin, unspecified CKD stage (HCC) 
fair - MICROALBUMIN, UR, RAND W/ MICROALB/CREAT RATIO 
- METABOLIC PANEL, COMPREHENSIVE 
- HEMOGLOBIN A1C W/O EAG 3. HTN (hypertension), benign Off meds today uisually better 4. Dyspnea on exertion Stable    Echo OK 5. Persistent proteinuria Lab Results Component Value Date/Time Microalbumin/Creat ratio (mg/g creat) 24 06/18/2010 10:04 AM  
 Microalb/Creat ratio (ug/mg creat.) 1,668.5 (H) 07/19/2018 09:23 AM  
 Microalbumin,urine random 2.66 06/18/2010 10:04 AM  
 
On ace and aldactone Prolonged visit with 15 minutes of time out  of more than a  25 minute visit spent counseling patient and family and formulation of care

## 2018-11-27 NOTE — PROGRESS NOTES
1. Have you been to the ER, urgent care clinic since your last visit? Hospitalized since your last visit? No 
 
2. Have you seen or consulted any other health care providers outside of the [address] [place] [address] since your last visit? Include any pap smears or colon screening. Yes.   Saw cardiologist in [place], [place]

## 2018-11-28 LAB
ALBUMIN SERPL-MCNC: 4 G/DL (ref 3.5–4.7)
ALBUMIN/CREAT UR: 4100.8 MG/G CREAT (ref 0–30)
ALBUMIN/GLOB SERPL: 1.4 {RATIO} (ref 1.2–2.2)
ALP SERPL-CCNC: 49 IU/L (ref 39–117)
ALT SERPL-CCNC: 12 IU/L (ref 0–32)
AST SERPL-CCNC: 13 IU/L (ref 0–40)
BASOPHILS # BLD AUTO: 0.1 X10E3/UL (ref 0–0.2)
BASOPHILS NFR BLD AUTO: 1 %
BILIRUB SERPL-MCNC: 0.3 MG/DL (ref 0–1.2)
BUN SERPL-MCNC: 28 MG/DL (ref 8–27)
BUN/CREAT SERPL: 27 (ref 12–28)
CALCIUM SERPL-MCNC: 10.4 MG/DL (ref 8.7–10.3)
CHLORIDE SERPL-SCNC: 104 MMOL/L (ref 96–106)
CHOLEST SERPL-MCNC: 160 MG/DL (ref 100–199)
CO2 SERPL-SCNC: 22 MMOL/L (ref 20–29)
CREAT SERPL-MCNC: 1.02 MG/DL (ref 0.57–1)
CREAT UR-MCNC: 39.2 MG/DL
EOSINOPHIL # BLD AUTO: 0.2 X10E3/UL (ref 0–0.4)
EOSINOPHIL NFR BLD AUTO: 2 %
ERYTHROCYTE [DISTWIDTH] IN BLOOD BY AUTOMATED COUNT: 12.9 % (ref 12.3–15.4)
GLOBULIN SER CALC-MCNC: 2.8 G/DL (ref 1.5–4.5)
GLUCOSE SERPL-MCNC: 218 MG/DL (ref 65–99)
HBA1C MFR BLD: 8.1 % (ref 4.8–5.6)
HCT VFR BLD AUTO: 38 % (ref 34–46.6)
HDLC SERPL-MCNC: 40 MG/DL
HGB BLD-MCNC: 12.1 G/DL (ref 11.1–15.9)
IMM GRANULOCYTES # BLD: 0 X10E3/UL (ref 0–0.1)
IMM GRANULOCYTES NFR BLD: 0 %
INTERPRETATION, 910389: NORMAL
INTERPRETATION: NORMAL
LDLC SERPL CALC-MCNC: 53 MG/DL (ref 0–99)
LYMPHOCYTES # BLD AUTO: 2.9 X10E3/UL (ref 0.7–3.1)
LYMPHOCYTES NFR BLD AUTO: 29 %
Lab: NORMAL
MCH RBC QN AUTO: 31.5 PG (ref 26.6–33)
MCHC RBC AUTO-ENTMCNC: 31.8 G/DL (ref 31.5–35.7)
MCV RBC AUTO: 99 FL (ref 79–97)
MICROALBUMIN UR-MCNC: 1607.5 UG/ML
MONOCYTES # BLD AUTO: 1 X10E3/UL (ref 0.1–0.9)
MONOCYTES NFR BLD AUTO: 10 %
NEUTROPHILS # BLD AUTO: 5.8 X10E3/UL (ref 1.4–7)
NEUTROPHILS NFR BLD AUTO: 58 %
PDF IMAGE, 910387: NORMAL
PLATELET # BLD AUTO: 377 X10E3/UL (ref 150–379)
POTASSIUM SERPL-SCNC: 5.7 MMOL/L (ref 3.5–5.2)
PROT SERPL-MCNC: 6.8 G/DL (ref 6–8.5)
RBC # BLD AUTO: 3.84 X10E6/UL (ref 3.77–5.28)
SODIUM SERPL-SCNC: 142 MMOL/L (ref 134–144)
TRIGL SERPL-MCNC: 333 MG/DL (ref 0–149)
VLDLC SERPL CALC-MCNC: 67 MG/DL (ref 5–40)
WBC # BLD AUTO: 10.1 X10E3/UL (ref 3.4–10.8)

## 2018-11-28 NOTE — PROGRESS NOTES
Protein in urine has really gotten bad and potassium still high Advise stop  Spironolactone(aldactone) and low potassium diet needed Diabetes   Poor control noted  So try using insulin  levemir 25 units BID  Instead of 50 units at once Advise will need a nephrologist to help with kidney disease  ( can get one in [address] where she lives most of the time)  DR Karen Bojorquez  or DR Bhavik Dolan

## 2018-11-29 NOTE — PROGRESS NOTES
Writer spoke with patient and informed of all results with instruction per Dr. Amanda Small, patient wanted all documents sent to her address in [place], [address] [place] [address], [address]  Writer has mailed lab documents with results, Low Potassium Diet and names with numbers for the Nephrologist suggested in [place].

## 2018-12-03 RX ORDER — SIMVASTATIN 40 MG/1
TABLET, FILM COATED ORAL
Qty: 90 TAB | Refills: 1 | Status: SHIPPED | OUTPATIENT
Start: 2018-12-03

## 2018-12-19 RX ORDER — SERTRALINE HYDROCHLORIDE 25 MG/1
TABLET, FILM COATED ORAL
Qty: 90 TAB | Refills: 1 | Status: SHIPPED | OUTPATIENT
Start: 2018-12-19 | End: 2019-05-06 | Stop reason: SDUPTHER

## 2019-01-02 RX ORDER — INSULIN DETEMIR 100 [IU]/ML
INJECTION, SOLUTION SUBCUTANEOUS
Qty: 3 PEN | Refills: 1 | Status: SHIPPED | OUTPATIENT
Start: 2019-01-02 | End: 2019-02-26

## 2019-02-26 ENCOUNTER — HOSPITAL ENCOUNTER (OUTPATIENT)
Dept: LAB | Age: 84
Discharge: HOME | End: 2019-02-26
Payer: MEDICARE

## 2019-02-26 ENCOUNTER — OFFICE VISIT (OUTPATIENT)
Dept: INTERNAL MEDICINE CLINIC | Age: 84
End: 2019-02-26

## 2019-02-26 VITALS
HEIGHT: 61 IN | HEART RATE: 81 BPM | OXYGEN SATURATION: 98 % | BODY MASS INDEX: 27.38 KG/M2 | TEMPERATURE: 96.6 F | RESPIRATION RATE: 20 BRPM | WEIGHT: 145 LBS | SYSTOLIC BLOOD PRESSURE: 159 MMHG | DIASTOLIC BLOOD PRESSURE: 70 MMHG

## 2019-02-26 DIAGNOSIS — Z74.09 MOBILITY IMPAIRED: ICD-10-CM
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
## 2019-02-26 DIAGNOSIS — I25.10 CORONARY ARTERY DISEASE INVOLVING NATIVE CORONARY ARTERY OF NATIVE HEART WITHOUT ANGINA PECTORIS: ICD-10-CM

## 2019-02-26 PROBLEM — R80.1 PERSISTENT PROTEINURIA: Status: ACTIVE | Noted: 2019-02-26

## 2019-02-26 PROCEDURE — 83036 HEMOGLOBIN GLYCOSYLATED A1C: CPT

## 2019-02-26 PROCEDURE — 36415 COLL VENOUS BLD VENIPUNCTURE: CPT

## 2019-02-26 PROCEDURE — 80053 COMPREHEN METABOLIC PANEL: CPT

## 2019-02-26 NOTE — PROGRESS NOTES
1. Have you been to the ER, urgent care clinic since your last visit? Hospitalized since your last visit? No 
 
2. Have you seen or consulted any other health care providers outside of the [address] [place] [address] since your last visit? Include any pap smears or colon screening. Yes. Cardiology- dr Kate Boo

## 2019-02-26 NOTE — PROGRESS NOTES
Chief Complaint Patient presents with  Diabetes  Coronary Artery Disease  Hypertension  Sleep Apnea  Cholesterol Problem Subjective:  She has longstanding coronary disease, diabetes, using insulin, is not checking sugars. Has had 1-2 spells of either low sugar or low blood pressure, but not clear. Now doing   Blood sugar testing  rarely  She's compliant with her meds. Denies PND, orthopnea or flash pulmonary edema, but has had some peripheral edema and has had more shortness of breath with exertion. Significant in recent lab work is that diabetes control is not as good and she's developed rather significant urine microalbuminuria. She's had leg edema that doesn't seem to resolve. Saw cardiology no med changes Saw card in [place] a week ago no changes Vitals:  
 02/26/19 1413 02/26/19 1451 BP: 168/68 159/70 Pulse: 81 Resp: 20 Temp: 96.6 °F (35.9 °C) TempSrc: Oral   
SpO2: 98% Weight: 145 lb (65.8 kg) Height: 5' 1\" (1.549 m) No meds  Today  So BP high Physical Examination:  BP was as noted. Lungs were clear. She had a regular heart rhythm with an S4. Abdomen soft. Bilateral 1+ pedal edema. Plan:  I reviewed her labs. Echo was done in [place] and EF  50 %   I have recommended low sodium diet. No big change in her meds. Could consider adding a stronger diuretic. Referral to renal, referral to cardiology eventually will be needed. She will continue her Spironolactone and present diuretic, which is Chlorthalidone. Diabetes control not perfect. Increase the bedtime insulin to 50. Follow up with me in 90 days. I have ordered her home glucose monitoring kit where she can do it without fingersticks. Has been checking and gets      Random OK results Vitals:  
 02/26/19 1413 BP: 168/68 Pulse: 81 Resp: 20 Temp: 96.6 °F (35.9 °C) TempSrc: Oral  
SpO2: 98% Weight: 145 lb (65.8 kg) Height: 5' 1\" (1.549 m) Patient Active Problem List  
 Diagnosis  Persistent proteinuria  Microalbuminuria  Type 2 diabetes mellitus with diabetic neuropathy (HCC)  Hypomagnesemia  SAH (subarachnoid hemorrhage) ([address]  Fall  Advanced directives, counseling/discussion  Type 2 diabetes mellitus with circulatory disorder, with long-term current use of insulin ([address]  HTN (hypertension), benign  Coronary artery disease involving native coronary artery of native heart without angina pectoris Saw cardiology in [address]  Urinary incontinence without sensory awareness  Type 2 diabetes mellitus with circulatory disorder ([address]  Mobility impaired Use cane and as needed wheelchair and walker  Vitamin D deficiency  Diabetes mellitus with renal manifestations, uncontrolled ([address]  Diabetes ([address]  CAD (coronary artery disease), native coronary artery  Hypercholesterolemia  REYMUNDO (obstructive sleep apnea) Not using CPAP Diagnoses and all orders for this visit: 1. Diabetes mellitus with renal manifestations, uncontrolled ([address] -     METABOLIC PANEL, COMPREHENSIVE 
-     HEMOGLOBIN A1C W/O EAG Other orders 
-     insulin detemir U-100 (LEVEMIR FLEXTOUCH U-100 INSULN) 100 unit/mL (3 mL) inpn; 25 Units by SubCUTAneous route Before breakfast and dinner. 5. Persistent proteinuria Lab Results Component Value Date/Time Microalbumin/Creat ratio (mg/g creat) 24 06/18/2010 10:04 AM  
 Microalb/Creat ratio (ug/mg creat.) 4,100.8 (H) 11/27/2018 03:01 PM  
 Microalbumin,urine random 2.66 06/18/2010 10:04 AM  
 
Lab Results Component Value Date/Time Microalbumin/Creat ratio (mg/g creat) 24 06/18/2010 10:04 AM  
 Microalb/Creat ratio (ug/mg creat.) 4,100.8 (H) 11/27/2018 03:01 PM  
 Microalbumin,urine random 2.66 06/18/2010 10:04 AM  
 
 
On ace   No aldactone needs laBS REPEATED  Did not see nephrology 1. Diabetes mellitus with renal manifestations, uncontrolled ([address] Labs every 3 months - METABOLIC PANEL, COMPREHENSIVE 
- HEMOGLOBIN A1C W/O EAG 2. Coronary artery disease involving native coronary artery of native heart without angina pectoris No symptoms 3. Mobility impaired Getting worse refuses PT will not use home exercise equipment 4. Fall, subsequent encounter Several no injury Prolonged visit with 15 minutes of time out  of more than a  25 minute visit spent counseling patient and family and formulation of care Still advise nephrology consult

## 2019-02-27 LAB
ALBUMIN SERPL-MCNC: 3.4 G/DL (ref 3.5–4.7)
ALBUMIN/GLOB SERPL: 1 {RATIO} (ref 1.2–2.2)
ALP SERPL-CCNC: 52 IU/L (ref 39–117)
ALT SERPL-CCNC: 16 IU/L (ref 0–32)
AST SERPL-CCNC: 17 IU/L (ref 0–40)
BILIRUB SERPL-MCNC: 0.3 MG/DL (ref 0–1.2)
BUN SERPL-MCNC: 26 MG/DL (ref 8–27)
BUN/CREAT SERPL: 25 (ref 12–28)
CALCIUM SERPL-MCNC: 10.1 MG/DL (ref 8.7–10.3)
CHLORIDE SERPL-SCNC: 101 MMOL/L (ref 96–106)
CO2 SERPL-SCNC: 19 MMOL/L (ref 20–29)
CREAT SERPL-MCNC: 1.06 MG/DL (ref 0.57–1)
GLOBULIN SER CALC-MCNC: 3.3 G/DL (ref 1.5–4.5)
GLUCOSE SERPL-MCNC: 224 MG/DL (ref 65–99)
HBA1C MFR BLD: 7.5 % (ref 4.8–5.6)
INTERPRETATION: NORMAL
Lab: NORMAL
POTASSIUM SERPL-SCNC: 4.3 MMOL/L (ref 3.5–5.2)
PROT SERPL-MCNC: 6.7 G/DL (ref 6–8.5)
SODIUM SERPL-SCNC: 144 MMOL/L (ref 134–144)

## 2019-02-28 NOTE — PROGRESS NOTES
Diabetes a little better Kidney function stable Low protein on blood work related to severe proteinuria

## 2019-02-28 NOTE — PROGRESS NOTES
Writer contacted patient to inform of blood work per Dr. Jay Regalado, patient verbalized understanding.

## 2019-03-13 RX ORDER — INSULIN DETEMIR 100 [IU]/ML
INJECTION, SOLUTION SUBCUTANEOUS
Qty: 5 PEN | Refills: 1 | Status: SHIPPED | OUTPATIENT
Start: 2019-03-13 | End: 2019-05-22 | Stop reason: ALTCHOICE

## 2019-04-08 RX ORDER — FELODIPINE 10 MG/1
TABLET, EXTENDED RELEASE ORAL
Qty: 90 TAB | Refills: 1 | Status: SHIPPED | OUTPATIENT
Start: 2019-04-08 | End: 2019-08-19 | Stop reason: SDUPTHER

## 2019-05-07 RX ORDER — SERTRALINE HYDROCHLORIDE 25 MG/1
TABLET, FILM COATED ORAL
Qty: 90 TAB | Refills: 1 | Status: SHIPPED | OUTPATIENT
Start: 2019-05-07

## 2019-05-21 RX ORDER — LOSARTAN POTASSIUM AND HYDROCHLOROTHIAZIDE 25; 100 MG/1; MG/1
TABLET ORAL
Qty: 90 TAB | Refills: 2 | Status: SHIPPED | OUTPATIENT
Start: 2019-05-21

## 2019-05-22 ENCOUNTER — OFFICE VISIT (OUTPATIENT)
Dept: INTERNAL MEDICINE CLINIC | Age: 84
End: 2019-05-22

## 2019-05-22 VITALS
WEIGHT: 144 LBS | TEMPERATURE: 96.2 F | SYSTOLIC BLOOD PRESSURE: 140 MMHG | BODY MASS INDEX: 27.19 KG/M2 | HEIGHT: 61 IN | RESPIRATION RATE: 18 BRPM | OXYGEN SATURATION: 97 % | HEART RATE: 62 BPM | DIASTOLIC BLOOD PRESSURE: 60 MMHG

## 2019-05-22 DIAGNOSIS — I10 HTN (HYPERTENSION), BENIGN: ICD-10-CM

## 2019-05-22 DIAGNOSIS — I25.10 CORONARY ARTERY DISEASE INVOLVING NATIVE CORONARY ARTERY OF NATIVE HEART WITHOUT ANGINA PECTORIS: ICD-10-CM

## 2019-05-22 DIAGNOSIS — M54.31 SCIATICA OF RIGHT SIDE: ICD-10-CM

## 2019-05-22 LAB — HBA1C MFR BLD HPLC: 9.3 %

## 2019-05-22 RX ORDER — ACETAMINOPHEN AND CODEINE PHOSPHATE 300; 30 MG/1; MG/1
1 TABLET ORAL
COMMUNITY

## 2019-05-22 RX ORDER — DICLOFENAC SODIUM 10 MG/G
GEL TOPICAL
Qty: 100 G | Refills: 3 | Status: SHIPPED | OUTPATIENT
Start: 2019-05-22

## 2019-05-22 NOTE — PROGRESS NOTES
Chief Complaint   Patient presents with    Diabetes    Coronary Artery Disease    Sleep Apnea    Cholesterol Problem     Subjective:  She has longstanding coronary disease, diabetes, using insulin, is not checking sugars. Has had 1-2 spells of either low sugar or low blood pressure, but not clear. Now doing   Blood sugar testing  rarely  She's compliant with her meds. Denies PND, orthopnea or flash pulmonary edema, but has had some peripheral edema and has had more shortness of breath with exertion. Significant in recent lab work is that diabetes control is not as good and she's developed rather significant urine microalbuminuria. She's had leg edema that doesn't seem to resolve. Saw cardiology no med changes    Saw card in [place] a week agoBP up some more    Told to get PCP in [place] and I agree  Having sciatica pain right to knee using prednisone  Vitals:    05/22/19 1400   BP: 149/60   Pulse: 62   Resp: 18   Temp: 96.2 °F (35.7 °C)   TempSrc: Oral   SpO2: 97%   Weight: 144 lb (65.3 kg)   Height: 5' 1\" (1.549 m)       Physical Examination:  BP was as noted. Lungs were clear. She had a regular heart rhythm with an S4. Abdomen soft. Bilateral 1+ pedal edema. Plan:  I reviewed her labs. Echo was done in [place] and EF  50 %   I have recommended low sodium diet. No big change in her meds. Could consider adding a stronger diuretic. Referral to renal, referral to cardiology eventually will be needed. She will continue her Spironolactone and present diuretic, which is Chlorthalidone. Diabetes control not perfect. Increase the bedtime insulin to 50. Follow up with me in 90 days. I have ordered her home glucose monitoring kit where she can do it without fingersticks.   Has been checking and gets      Random OK results    Vitals:    05/22/19 1400   BP: 149/60   Pulse: 62   Resp: 18   Temp: 96.2 °F (35.7 °C)   TempSrc: Oral   SpO2: 97%   Weight: 144 lb (65.3 kg)   Height: 5' 1\" (1.549 m)     Patient Active Problem List    Diagnosis    Persistent proteinuria    Microalbuminuria    Type 2 diabetes mellitus with diabetic neuropathy (HCC)    Hypomagnesemia    SAH (subarachnoid hemorrhage) (HCC)    Fall    Advanced directives, counseling/discussion    Type 2 diabetes mellitus with circulatory disorder, with long-term current use of insulin (HCC)    HTN (hypertension), benign    Coronary artery disease involving native coronary artery of native heart without angina pectoris     [facility] 11.[facility]      Urinary incontinence without sensory awareness    Type 2 diabetes mellitus with circulatory disorder ([address]    Mobility impaired     Use cane and as needed wheelchair and walker          Vitamin D deficiency    Diabetes mellitus with renal manifestations, uncontrolled ([address]    Diabetes ([address]    CAD (coronary artery disease), native coronary artery    Hypercholesterolemia    REYMUNDO (obstructive sleep apnea)     Not using CPAP       Diagnoses and all orders for this visit:    1. Diabetes mellitus with renal manifestations, uncontrolled (HCC)  -     AMB POC HEMOGLOBIN A1C    2. Fall, initial encounter    3. HTN (hypertension), benign    4. Coronary artery disease involving native coronary artery of native heart without angina pectoris    Other orders  -     insulin detemir U-100 (LEVEMIR FLEXTOUCH U-100 INSULN) 100 unit/mL (3 mL) inpn; 30 Units by SubCUTAneous route Before breakfast and dinner.  -     diclofenac (VOLTAREN) 1 % gel; Apply  to affected area three (3) times daily as needed for Other. 1. Diabetes mellitus with renal manifestations, uncontrolled (HCC)  Poor stop prednisone  - AMB POC HEMOGLOBIN A1C    2. HTN (hypertension), benign  Ok today    3. Coronary artery disease involving native coronary artery of native heart without angina pectoris  No chest pain    4.  Sciatica of right side  Try PT as suggested  Stop prednisone  Try diclofenac gel

## 2019-05-22 NOTE — PROGRESS NOTES
1. Have you been to the ER, urgent care clinic since your last visit? Hospitalized since your last visit?yes. ED in  [facility] for back pain/sciatica    2. Have you seen or consulted any other health care providers outside of the [address] [place] [address] since your last visit? Include any pap smears or colon screening. Yes.   Cardiology-Dr Magui rhodes,

## 2019-06-03 RX ORDER — CARVEDILOL 25 MG/1
TABLET ORAL
Qty: 180 TAB | Refills: 3 | Status: SHIPPED | OUTPATIENT
Start: 2019-06-03

## 2019-08-14 RX ORDER — METFORMIN HYDROCHLORIDE 1000 MG/1
TABLET ORAL
Qty: 180 TAB | Refills: 3 | Status: SHIPPED | OUTPATIENT
Start: 2019-08-14

## 2019-08-19 RX ORDER — FELODIPINE 10 MG/1
TABLET, EXTENDED RELEASE ORAL
Qty: 90 TAB | Refills: 1 | Status: SHIPPED | OUTPATIENT
Start: 2019-08-19

## 2020-12-04 NOTE — PROGRESS NOTES
Advance Care Planning Note    Name: Oracio Hirsch  YOB: 1933  MRN: [id number]  Admission Date: 11/22/2017  2:00 PM    Date of discussion: 11/22/2017    Active Diagnoses:    Hospital Problems  Date Reviewed: 8/28/2017          Codes Class Noted POA    [facility]ach[facility]) [facility] ICD-10-CM: I60.9  ICD-9-CM: 543  11/22/2017 Unknown        Fall ICD-10-CM: Erleen LatinKareen Sim Lay  ICD-9-CM: E888.9  11/22/2017 Unknown               These active diagnoses are of sufficient risk that focused discussion on advance care planning is indicated in order to allow the patient to thoughtfully consider personal goals of care, and if situations arise that prevent the ability to personally give input, to ensure appropriate representation of their personal desires for different levels and aggressiveness of care. Discussion:     Persons present and participating in discussion: Oracio Hirsch, Johnna Biggs MD, and daughters and son. Discussion: I discussed if patient have advance care directives in place. Patient never had this discussion in past. Patient wishes no artificial means to prolong life. Patient never wants to be placed on ventilator or be resuscitated in event of cardiac arrest. Explained we will have Advance care directives fill out while patient is in hospital for future and also have DDNR filled out as well. Pt's admission diagnosis were discussed and discussed her risk of deterioration. Time Spent:     Total time spent face-to-face in education and discussion: 22 minutes.      Johnna Biggs MD  11/22/2017  6:55 PM no